# Patient Record
Sex: MALE | Race: BLACK OR AFRICAN AMERICAN | Employment: FULL TIME | URBAN - METROPOLITAN AREA
[De-identification: names, ages, dates, MRNs, and addresses within clinical notes are randomized per-mention and may not be internally consistent; named-entity substitution may affect disease eponyms.]

---

## 2018-11-26 ENCOUNTER — HOSPITAL ENCOUNTER (INPATIENT)
Age: 18
LOS: 4 days | Discharge: HOME | DRG: 637 | End: 2018-11-30
Attending: EMERGENCY MEDICINE | Admitting: FAMILY MEDICINE
Payer: MEDICAID

## 2018-11-26 DIAGNOSIS — E11.10 DIABETIC KETOACIDOSIS WITHOUT COMA ASSOCIATED WITH TYPE 2 DIABETES MELLITUS (HCC): Primary | ICD-10-CM

## 2018-11-26 PROBLEM — E66.01 MORBID OBESITY (HCC): Status: ACTIVE | Noted: 2018-11-26

## 2018-11-26 PROBLEM — G93.41 ACUTE METABOLIC ENCEPHALOPATHY: Status: ACTIVE | Noted: 2018-11-26

## 2018-11-26 LAB
ADMINISTERED INITIALS, ADMINIT: NORMAL
ALBUMIN SERPL-MCNC: 4.5 G/DL (ref 3.5–5)
ALBUMIN/GLOB SERPL: 1.2 {RATIO} (ref 1.2–3.5)
ALP SERPL-CCNC: 131 U/L (ref 50–136)
ALT SERPL-CCNC: 54 U/L (ref 12–65)
ANION GAP SERPL CALC-SCNC: 21 MMOL/L (ref 7–16)
ANION GAP SERPL CALC-SCNC: 25 MMOL/L (ref 7–16)
AST SERPL-CCNC: 22 U/L (ref 15–37)
ATRIAL RATE: 98 BPM
BACTERIA URNS QL MICRO: 0 /HPF
BASOPHILS # BLD: 0 K/UL (ref 0–0.2)
BASOPHILS NFR BLD: 1 % (ref 0–2)
BILIRUB SERPL-MCNC: 0.6 MG/DL (ref 0.2–1.1)
BUN SERPL-MCNC: 10 MG/DL (ref 6–23)
BUN SERPL-MCNC: 13 MG/DL (ref 6–23)
CALCIUM SERPL-MCNC: 8.8 MG/DL (ref 8.3–10.4)
CALCIUM SERPL-MCNC: 9.3 MG/DL (ref 8.3–10.4)
CALCULATED P AXIS, ECG09: 41 DEGREES
CALCULATED R AXIS, ECG10: 178 DEGREES
CALCULATED T AXIS, ECG11: 12 DEGREES
CASTS URNS QL MICRO: 0 /LPF
CHLORIDE SERPL-SCNC: 103 MMOL/L (ref 98–107)
CHLORIDE SERPL-SCNC: 97 MMOL/L (ref 98–107)
CO2 SERPL-SCNC: 12 MMOL/L (ref 21–32)
CO2 SERPL-SCNC: 12 MMOL/L (ref 21–32)
CREAT SERPL-MCNC: 0.98 MG/DL (ref 0.8–1.5)
CREAT SERPL-MCNC: 1.06 MG/DL (ref 0.8–1.5)
CRYSTALS URNS QL MICRO: 0 /LPF
D50 ADMINISTERED, D50ADM: 0 ML
D50 ORDER, D50ORD: 0 ML
DIAGNOSIS, 93000: NORMAL
DIFFERENTIAL METHOD BLD: ABNORMAL
EOSINOPHIL # BLD: 0.2 K/UL (ref 0–0.8)
EOSINOPHIL NFR BLD: 2 % (ref 0.5–7.8)
EPI CELLS #/AREA URNS HPF: NORMAL /HPF
ERYTHROCYTE [DISTWIDTH] IN BLOOD BY AUTOMATED COUNT: 13.6 %
EST. AVERAGE GLUCOSE BLD GHB EST-MCNC: 301 MG/DL
GLOBULIN SER CALC-MCNC: 3.9 G/DL (ref 2.3–3.5)
GLSCOM COMMENTS: NORMAL
GLUCOSE BLD STRIP.AUTO-MCNC: 281 MG/DL (ref 65–100)
GLUCOSE BLD STRIP.AUTO-MCNC: 285 MG/DL (ref 65–100)
GLUCOSE BLD STRIP.AUTO-MCNC: 304 MG/DL (ref 65–100)
GLUCOSE BLD STRIP.AUTO-MCNC: 326 MG/DL (ref 65–100)
GLUCOSE BLD STRIP.AUTO-MCNC: 339 MG/DL (ref 65–100)
GLUCOSE BLD STRIP.AUTO-MCNC: 367 MG/DL (ref 65–100)
GLUCOSE BLD STRIP.AUTO-MCNC: 470 MG/DL (ref 65–100)
GLUCOSE SERPL-MCNC: 275 MG/DL (ref 65–100)
GLUCOSE SERPL-MCNC: 433 MG/DL (ref 65–100)
GLUCOSE, GLC: 203 MG/DL
GLUCOSE, GLC: 281 MG/DL
GLUCOSE, GLC: 285 MG/DL
GLUCOSE, GLC: 304 MG/DL
GLUCOSE, GLC: 326 MG/DL
GLUCOSE, GLC: 339 MG/DL
GLUCOSE, GLC: 357 MG/DL
HBA1C MFR BLD: 12.1 % (ref 4.8–6)
HCT VFR BLD AUTO: 48.8 % (ref 41.1–50.3)
HGB BLD-MCNC: 16.4 G/DL (ref 13.6–17.2)
HIGH TARGET, HITG: 250 MG/DL
IMM GRANULOCYTES # BLD: 0 K/UL (ref 0–0.5)
IMM GRANULOCYTES NFR BLD AUTO: 0 % (ref 0–5)
INSULIN ADMINSTERED, INSADM: 11.1 UNITS/HOUR
INSULIN ADMINSTERED, INSADM: 5.3 UNITS/HOUR
INSULIN ADMINSTERED, INSADM: 5.9 UNITS/HOUR
INSULIN ADMINSTERED, INSADM: 7.2 UNITS/HOUR
INSULIN ADMINSTERED, INSADM: 7.3 UNITS/HOUR
INSULIN ADMINSTERED, INSADM: 8.4 UNITS/HOUR
INSULIN ADMINSTERED, INSADM: 9 UNITS/HOUR
INSULIN ORDER, INSORD: 11.1 UNITS/HOUR
INSULIN ORDER, INSORD: 5.3 UNITS/HOUR
INSULIN ORDER, INSORD: 5.9 UNITS/HOUR
INSULIN ORDER, INSORD: 7.2 UNITS/HOUR
INSULIN ORDER, INSORD: 7.3 UNITS/HOUR
INSULIN ORDER, INSORD: 8.4 UNITS/HOUR
INSULIN ORDER, INSORD: 9 UNITS/HOUR
LOW TARGET, LOT: 150 MG/DL
LYMPHOCYTES # BLD: 3 K/UL (ref 0.5–4.6)
LYMPHOCYTES NFR BLD: 40 % (ref 13–44)
MAGNESIUM SERPL-MCNC: 1.8 MG/DL (ref 1.8–2.4)
MCH RBC QN AUTO: 27.2 PG (ref 26.1–32.9)
MCHC RBC AUTO-ENTMCNC: 33.6 G/DL (ref 31.4–35)
MCV RBC AUTO: 80.9 FL (ref 79.6–97.8)
MINUTES UNTIL NEXT BG, NBG: 60 MIN
MONOCYTES # BLD: 0.6 K/UL (ref 0.1–1.3)
MONOCYTES NFR BLD: 8 % (ref 4–12)
MUCOUS THREADS URNS QL MICRO: 0 /LPF
MULTIPLIER, MUL: 0.02
MULTIPLIER, MUL: 0.02
MULTIPLIER, MUL: 0.03
MULTIPLIER, MUL: 0.03
MULTIPLIER, MUL: 0.04
MULTIPLIER, MUL: 0.05
MULTIPLIER, MUL: 0.05
NEUTS SEG # BLD: 3.6 K/UL (ref 1.7–8.2)
NEUTS SEG NFR BLD: 49 % (ref 43–78)
NRBC # BLD: 0 K/UL (ref 0–0.2)
ORDER INITIALS, ORDINIT: NORMAL
P-R INTERVAL, ECG05: 170 MS
PHOSPHATE SERPL-MCNC: 2 MG/DL (ref 2.5–4.5)
PLATELET # BLD AUTO: 282 K/UL (ref 150–450)
PMV BLD AUTO: 13.3 FL (ref 9.4–12.3)
POTASSIUM SERPL-SCNC: 3.4 MMOL/L (ref 3.5–5.1)
POTASSIUM SERPL-SCNC: 4 MMOL/L (ref 3.5–5.1)
PROT SERPL-MCNC: 8.4 G/DL (ref 6.3–8.2)
Q-T INTERVAL, ECG07: 350 MS
QRS DURATION, ECG06: 102 MS
QTC CALCULATION (BEZET), ECG08: 446 MS
RBC # BLD AUTO: 6.03 M/UL (ref 4.23–5.6)
RBC #/AREA URNS HPF: NORMAL /HPF
SODIUM SERPL-SCNC: 134 MMOL/L (ref 136–145)
SODIUM SERPL-SCNC: 136 MMOL/L (ref 136–145)
VENTRICULAR RATE, ECG03: 98 BPM
WBC # BLD AUTO: 7.4 K/UL (ref 4.3–11.1)
WBC URNS QL MICRO: NORMAL /HPF

## 2018-11-26 PROCEDURE — 82962 GLUCOSE BLOOD TEST: CPT

## 2018-11-26 PROCEDURE — 74011250636 HC RX REV CODE- 250/636: Performed by: FAMILY MEDICINE

## 2018-11-26 PROCEDURE — 74011000258 HC RX REV CODE- 258: Performed by: FAMILY MEDICINE

## 2018-11-26 PROCEDURE — 74011250636 HC RX REV CODE- 250/636: Performed by: EMERGENCY MEDICINE

## 2018-11-26 PROCEDURE — 81003 URINALYSIS AUTO W/O SCOPE: CPT | Performed by: EMERGENCY MEDICINE

## 2018-11-26 PROCEDURE — 83036 HEMOGLOBIN GLYCOSYLATED A1C: CPT

## 2018-11-26 PROCEDURE — 80053 COMPREHEN METABOLIC PANEL: CPT

## 2018-11-26 PROCEDURE — 93005 ELECTROCARDIOGRAM TRACING: CPT | Performed by: EMERGENCY MEDICINE

## 2018-11-26 PROCEDURE — 65620000000 HC RM CCU GENERAL

## 2018-11-26 PROCEDURE — 99284 EMERGENCY DEPT VISIT MOD MDM: CPT | Performed by: EMERGENCY MEDICINE

## 2018-11-26 PROCEDURE — C9113 INJ PANTOPRAZOLE SODIUM, VIA: HCPCS | Performed by: HOSPITALIST

## 2018-11-26 PROCEDURE — 85025 COMPLETE CBC W/AUTO DIFF WBC: CPT

## 2018-11-26 PROCEDURE — 81015 MICROSCOPIC EXAM OF URINE: CPT

## 2018-11-26 PROCEDURE — 84100 ASSAY OF PHOSPHORUS: CPT

## 2018-11-26 PROCEDURE — 74011250636 HC RX REV CODE- 250/636: Performed by: HOSPITALIST

## 2018-11-26 PROCEDURE — 74011636637 HC RX REV CODE- 636/637: Performed by: FAMILY MEDICINE

## 2018-11-26 PROCEDURE — 74011000250 HC RX REV CODE- 250: Performed by: HOSPITALIST

## 2018-11-26 PROCEDURE — 96374 THER/PROPH/DIAG INJ IV PUSH: CPT | Performed by: EMERGENCY MEDICINE

## 2018-11-26 PROCEDURE — 74011636637 HC RX REV CODE- 636/637: Performed by: EMERGENCY MEDICINE

## 2018-11-26 PROCEDURE — 36415 COLL VENOUS BLD VENIPUNCTURE: CPT

## 2018-11-26 PROCEDURE — 83735 ASSAY OF MAGNESIUM: CPT

## 2018-11-26 RX ORDER — POTASSIUM CHLORIDE AND SODIUM CHLORIDE 450; 150 MG/100ML; MG/100ML
INJECTION, SOLUTION INTRAVENOUS CONTINUOUS
Status: DISCONTINUED | OUTPATIENT
Start: 2018-11-26 | End: 2018-11-27

## 2018-11-26 RX ORDER — ENOXAPARIN SODIUM 100 MG/ML
40 INJECTION SUBCUTANEOUS EVERY 12 HOURS
Status: DISCONTINUED | OUTPATIENT
Start: 2018-11-26 | End: 2018-11-30 | Stop reason: HOSPADM

## 2018-11-26 RX ORDER — NALOXONE HYDROCHLORIDE 0.4 MG/ML
0.4 INJECTION, SOLUTION INTRAMUSCULAR; INTRAVENOUS; SUBCUTANEOUS AS NEEDED
Status: DISCONTINUED | OUTPATIENT
Start: 2018-11-26 | End: 2018-11-30 | Stop reason: HOSPADM

## 2018-11-26 RX ORDER — DEXTROSE 40 %
15 GEL (GRAM) ORAL AS NEEDED
Status: DISCONTINUED | OUTPATIENT
Start: 2018-11-26 | End: 2018-11-27

## 2018-11-26 RX ORDER — ACETAMINOPHEN 325 MG/1
650 TABLET ORAL
Status: DISCONTINUED | OUTPATIENT
Start: 2018-11-26 | End: 2018-11-30 | Stop reason: HOSPADM

## 2018-11-26 RX ORDER — HYDRALAZINE HYDROCHLORIDE 20 MG/ML
10 INJECTION INTRAMUSCULAR; INTRAVENOUS
Status: DISCONTINUED | OUTPATIENT
Start: 2018-11-26 | End: 2018-11-30 | Stop reason: HOSPADM

## 2018-11-26 RX ORDER — HYDROCODONE BITARTRATE AND ACETAMINOPHEN 5; 325 MG/1; MG/1
1 TABLET ORAL
Status: DISCONTINUED | OUTPATIENT
Start: 2018-11-26 | End: 2018-11-30 | Stop reason: HOSPADM

## 2018-11-26 RX ORDER — DEXTROSE 50 % IN WATER (D50W) INTRAVENOUS SYRINGE
25-50 AS NEEDED
Status: DISCONTINUED | OUTPATIENT
Start: 2018-11-26 | End: 2018-11-27

## 2018-11-26 RX ADMIN — SODIUM CHLORIDE 40 MG: 9 INJECTION INTRAMUSCULAR; INTRAVENOUS; SUBCUTANEOUS at 20:01

## 2018-11-26 RX ADMIN — SODIUM CHLORIDE AND POTASSIUM CHLORIDE: 4.5; 1.49 INJECTION, SOLUTION INTRAVENOUS at 17:36

## 2018-11-26 RX ADMIN — SODIUM CHLORIDE 1000 ML: 900 INJECTION, SOLUTION INTRAVENOUS at 14:33

## 2018-11-26 RX ADMIN — SODIUM CHLORIDE 5.9 UNITS/HR: 9 INJECTION, SOLUTION INTRAVENOUS at 17:37

## 2018-11-26 RX ADMIN — HUMAN INSULIN 10 UNITS: 100 INJECTION, SOLUTION SUBCUTANEOUS at 14:33

## 2018-11-26 RX ADMIN — HYDRALAZINE HYDROCHLORIDE 10 MG: 20 INJECTION INTRAMUSCULAR; INTRAVENOUS at 20:02

## 2018-11-26 NOTE — PROGRESS NOTES
TRANSFER - IN REPORT: 
 
Verbal report received from Donovan Leone, [place] (name) on Xu Arce  being received from ED (unit) for change in patient condition(glucostabilizer) Report consisted of patients Situation, Background, Assessment and  
Recommendations(SBAR). Information from the following report(s) SBAR, Kardex, ED Summary, Intake/Output, MAR, Recent Results and Cardiac Rhythm NSR was reviewed with the receiving nurse. Opportunity for questions and clarification was provided. Assessment completed upon patients arrival to unit and care assumed.

## 2018-11-26 NOTE — ED PROVIDER NOTES
Patient complains of blurred vision, increased thirst, nausea and vomiting, dizziness. Diagnosed with DM last summer at [address] In October he went to [facility] for school and did not take his insulin or glucometer. Has not used either in the past month. No diarrhea. No fever. No pain. No dysuria. No cough. Not a smoker. No alcohol. No past medical history on file. No past surgical history on file. No family history on file. Social History Socioeconomic History  Marital status: SINGLE Spouse name: Not on file  Number of children: Not on file  Years of education: Not on file  Highest education level: Not on file Social Needs  Financial resource strain: Not on file  Food insecurity - worry: Not on file  Food insecurity - inability: Not on file  Transportation needs - medical: Not on file  Transportation needs - non-medical: Not on file Occupational History  Not on file Tobacco Use  Smoking status: Not on file Substance and Sexual Activity  Alcohol use: Not on file  Drug use: Not on file  Sexual activity: Not on file Other Topics Concern  Not on file Social History Narrative  Not on file ALLERGIES: Patient has no known allergies. Review of Systems Constitutional: Positive for fatigue. Negative for appetite change. HENT: Negative. Eyes: Positive for visual disturbance. Respiratory: Negative. Cardiovascular: Negative. Gastrointestinal: Positive for nausea and vomiting. Negative for abdominal pain and diarrhea. Endocrine: Positive for polydipsia and polyuria. Genitourinary: Positive for frequency. Negative for dysuria. Musculoskeletal: Negative. Skin: Negative. Neurological: Positive for dizziness. Negative for weakness. Hematological: Negative. There were no vitals filed for this visit. Physical Exam  
Constitutional: He is oriented to person, place, and time. Non-toxic appearance. obese HENT:  
Head: Normocephalic and atraumatic. Eyes: EOM are normal. Pupils are equal, round, and reactive to light. MM dry Neck: Normal range of motion. Neck supple. Cardiovascular: Normal rate, regular rhythm, normal heart sounds and intact distal pulses. Pulmonary/Chest: Effort normal.  
Abdominal: Soft. Bowel sounds are normal. There is no tenderness. Musculoskeletal: Normal range of motion. Neurological: He is alert and oriented to person, place, and time. He has normal strength. Skin: Skin is warm and dry. Psychiatric: He has a normal mood and affect. Nursing note and vitals reviewed. MDM Number of Diagnoses or Management Options Diagnosis management comments: DKA Labs reviewed EKG sinus, rate 98, T wave inversion III. Insulin 10 units regular IV 
NS 1 liter IV Ice chips Zofran 4 mg IV Consult Hospitalist - Dr. Silvestre Corcoran - they will see Amount and/or Complexity of Data Reviewed Clinical lab tests: ordered and reviewed Discuss the patient with other providers: yes Independent visualization of images, tracings, or specimens: yes Critical Care Total time providing critical care: 30-74 minutes (Critical care time 35 minutes) Patient Progress Patient progress: stable Procedures

## 2018-11-26 NOTE — H&P
Hospitalist Admission History and Physical  
 
NAME:  Leandro Montalvo Age:  19 y.o. 
:   2000 MRN:   [id number] PCP: Unknown, Provider Consulting MD: Treatment Team: Attending Provider: Vessie Scheuermann, MD; Primary Nurse: Angelina Fitzgerald RN Chief Complaint Patient presents with  Altered mental status HPI:  
Patient is a 19 y.o. male who presented to the ED for a cc of AMS. Patient states he has a hx significant for DM type 2 insulin dependent diagnosed this summer. Patient states he is supposed to be taking insulin but lost his medication. He is not sure what dose of insulin he was supposed to be on and we cannot find in records. Since loosing his insulin, he has not been taking any medication. Since this Thanksgiving, has noticed increased altered mentation along with increased urinary frequency. Vitals stable. Glucose 470. CO2 12 and anion gap 25. UA pending. No past medical history on file. Type 2 DM insulin dependent No past surgical history on file. No surgeries No family history on file. No significant family hx Social History Social History Narrative  Not on file Social History Tobacco Use  Smoking status: Not on file Substance Use Topics  Alcohol use: Not on file Social History Substance and Sexual Activity Drug Use Not on file No Known Allergies Prior to Admission medications Not on File Review of Systems Constitutional: well nourished male in NAD Eyes:  no change in visual acuity, no photophobia Ears, nose, mouth, throat, and face: no  Odynphagia, dysphagia, no thrush or exudate, negative for chronic sinus congestion, recurrent headaches Respiratory: negative for SOB, hemoptysis or cough Cardiovascular: negative for CP, palpitations, or PND Gastrointestinal: negative for abdominal pain, no hematemesis, hematochezia or BRBPR Genitourinary: increased urinary frequency Integument/breast: negative for skin rash or skin lesions Hematologic/lymphatic: negative for known bleeding disorder Musculoskeletal:negative for joint pain or joint tenderness Neurological: Altered mentation with confusion Behavioral/Psych: negative for depression or chronic anxiety, Endocrine: negative for polydyspia, polyuria or intolerance to heat or cold Allergic/Immunologic: negative for chronic allergic rhinitis, or known connective tissue disorder Objective:  
 
Visit Vitals /67 SpO2 95% No intake/output data recorded. No intake/output data recorded. Data Review:  
Recent Results (from the past 24 hour(s)) GLUCOSE, POC Collection Time: 11/26/18  2:20 PM  
Result Value Ref Range Glucose (POC) 470 (HH) 65 - 100 mg/dL CBC WITH AUTOMATED DIFF Collection Time: 11/26/18  2:32 PM  
Result Value Ref Range WBC 7.4 4.3 - 11.1 K/uL  
 RBC 6.03 (H) 4.23 - 5.6 M/uL  
 HGB 16.4 13.6 - 17.2 g/dL HCT 48.8 41.1 - 50.3 % MCV 80.9 79.6 - 97.8 FL  
 MCH 27.2 26.1 - 32.9 PG  
 MCHC 33.6 31.4 - 35.0 g/dL  
 RDW 13.6 % PLATELET 089 745 - 987 K/uL MPV 13.3 (H) 9.4 - 12.3 FL ABSOLUTE NRBC 0.00 0.0 - 0.2 K/uL  
 DF AUTOMATED NEUTROPHILS 49 43 - 78 % LYMPHOCYTES 40 13 - 44 % MONOCYTES 8 4.0 - 12.0 % EOSINOPHILS 2 0.5 - 7.8 % BASOPHILS 1 0.0 - 2.0 % IMMATURE GRANULOCYTES 0 0.0 - 5.0 %  
 ABS. NEUTROPHILS 3.6 1.7 - 8.2 K/UL  
 ABS. LYMPHOCYTES 3.0 0.5 - 4.6 K/UL  
 ABS. MONOCYTES 0.6 0.1 - 1.3 K/UL  
 ABS. EOSINOPHILS 0.2 0.0 - 0.8 K/UL  
 ABS. BASOPHILS 0.0 0.0 - 0.2 K/UL  
 ABS. IMM. GRANS. 0.0 0.0 - 0.5 K/UL METABOLIC PANEL, COMPREHENSIVE Collection Time: 11/26/18  2:32 PM  
Result Value Ref Range Sodium 134 (L) 136 - 145 mmol/L Potassium 4.0 3.5 - 5.1 mmol/L Chloride 97 (L) 98 - 107 mmol/L  
 CO2 12 (L) 21 - 32 mmol/L Anion gap 25 (H) 7 - 16 mmol/L Glucose 433 (H) 65 - 100 mg/dL  BUN 13 6 - 23 MG/DL  
 Creatinine 1.06 0.8 - 1.5 MG/DL  
 GFR est AA >60 >60 ml/min/1.73m2 GFR est non-AA >60 >60 ml/min/1.73m2 Calcium 9.3 8.3 - 10.4 MG/DL Bilirubin, total 0.6 0.2 - 1.1 MG/DL  
 ALT (SGPT) 54 12 - 65 U/L  
 AST (SGOT) 22 15 - 37 U/L Alk. phosphatase 131 50 - 136 U/L Protein, total 8.4 (H) 6.3 - 8.2 g/dL Albumin 4.5 3.5 - 5.0 g/dL Globulin 3.9 (H) 2.3 - 3.5 g/dL A-G Ratio 1.2 1.2 - 3.5 Physical Exam:  
 
General:  Alert, cooperative, no distress, appears stated age. Eyes:  Conjunctivae/corneas clear. Ears:  Normal TMs and external ear canals both ears. Nose: Nares normal. Septum midline. Mouth/Throat: Dry oral mucosa Neck: no JVD. Back:   deferred Lungs:   Clear to auscultation bilaterally. Heart:  Regular rate and rhythm, S1, S2 normal, no murmur, click, rub or gallop. Abdomen:   Soft, non-tender. Bowel sounds normal. No masses,  No organomegaly. Obese Extremities: Extremities normal, atraumatic, no cyanosis or edema. Pulses: 2+ and symmetric all extremities. Skin: Skin color, texture, turgor normal. No rashes or lesions Lymph nodes: Cervical, supraclavicular, and axillary nodes normal.  
Neurologic: Mild confusion during exam but able to answer all questions appropriately Assessment and Plan Principal Problem: 
  DKA (diabetic ketoacidoses) ([facility] [address] (11/26/2018) Active Problems: 
  Type II diabetes mellitus, uncontrolled ([facility] [address] (11/26/2018) Morbid obesity ([facility] [address] (11/26/2018) Acute metabolic encephalopathy (05/14/7520) DKA protocol. UA pending. Consult DM education. Order 0.45% NS with KCL 20meq 250ml/hr until glucose is 200. Call physician at that time for new fluid order set due to needing dextrose. A1C ordered. Check BMP q 4 hrs until gap closed. Check Mg and phosphorus Obesity - DM education Acute metabolic encephalopathy - secondary to DKA. UA officially pending. Expect mentation to improve with orders as above. DVT prophylaxis - Lovenox Signed By: Jamel Farnsworth, DO November 26, 2018

## 2018-11-26 NOTE — ED NOTES
Spoke with Dr. Adelfo Jeffers and per her verbal order this RN will run the fluids and insulin concurrently.

## 2018-11-26 NOTE — ED NOTES
TRANSFER - OUT REPORT: 
 
Verbal report given to Justine(name) on Cinda Moreira  being transferred to [facility](unit) for routine progression of care Report consisted of patients Situation, Background, Assessment and  
Recommendations(SBAR). Information from the following report(s) SBAR, ED Summary, MAR, Recent Results and Cardiac Rhythm NSR was reviewed with the receiving nurse. Lines:  
Peripheral IV 11/26/18 Right Hand (Active) Opportunity for questions and clarification was provided. Patient transported with: 
 Monitor Registered Nurse

## 2018-11-26 NOTE — ED TRIAGE NOTES
Pt. Reports confusion, excessive urination, vomiting, and blurry vision, worsening x 3 days. Pt. States he is a diabetic and does not know the last time he checked his bgl.

## 2018-11-27 LAB
ADMINISTERED INITIALS, ADMINIT: NORMAL
ANION GAP SERPL CALC-SCNC: 12 MMOL/L (ref 7–16)
ANION GAP SERPL CALC-SCNC: 13 MMOL/L (ref 7–16)
ANION GAP SERPL CALC-SCNC: 16 MMOL/L (ref 7–16)
ANION GAP SERPL CALC-SCNC: 16 MMOL/L (ref 7–16)
ANION GAP SERPL CALC-SCNC: 18 MMOL/L (ref 7–16)
BUN SERPL-MCNC: 6 MG/DL (ref 6–23)
BUN SERPL-MCNC: 7 MG/DL (ref 6–23)
BUN SERPL-MCNC: 8 MG/DL (ref 6–23)
BUN SERPL-MCNC: 9 MG/DL (ref 6–23)
BUN SERPL-MCNC: 9 MG/DL (ref 6–23)
CALCIUM SERPL-MCNC: 8.4 MG/DL (ref 8.3–10.4)
CALCIUM SERPL-MCNC: 8.4 MG/DL (ref 8.3–10.4)
CALCIUM SERPL-MCNC: 8.6 MG/DL (ref 8.3–10.4)
CALCIUM SERPL-MCNC: 8.8 MG/DL (ref 8.3–10.4)
CALCIUM SERPL-MCNC: 8.8 MG/DL (ref 8.3–10.4)
CHLORIDE SERPL-SCNC: 103 MMOL/L (ref 98–107)
CHLORIDE SERPL-SCNC: 103 MMOL/L (ref 98–107)
CHLORIDE SERPL-SCNC: 104 MMOL/L (ref 98–107)
CHLORIDE SERPL-SCNC: 104 MMOL/L (ref 98–107)
CHLORIDE SERPL-SCNC: 105 MMOL/L (ref 98–107)
CO2 SERPL-SCNC: 13 MMOL/L (ref 21–32)
CO2 SERPL-SCNC: 15 MMOL/L (ref 21–32)
CO2 SERPL-SCNC: 15 MMOL/L (ref 21–32)
CO2 SERPL-SCNC: 17 MMOL/L (ref 21–32)
CO2 SERPL-SCNC: 18 MMOL/L (ref 21–32)
CREAT SERPL-MCNC: 0.75 MG/DL (ref 0.8–1.5)
CREAT SERPL-MCNC: 0.76 MG/DL (ref 0.8–1.5)
CREAT SERPL-MCNC: 0.81 MG/DL (ref 0.8–1.5)
CREAT SERPL-MCNC: 0.88 MG/DL (ref 0.8–1.5)
CREAT SERPL-MCNC: 0.88 MG/DL (ref 0.8–1.5)
D50 ADMINISTERED, D50ADM: 0 ML
D50 ORDER, D50ORD: 0 ML
GLSCOM COMMENTS: NORMAL
GLUCOSE BLD STRIP.AUTO-MCNC: 152 MG/DL (ref 65–100)
GLUCOSE BLD STRIP.AUTO-MCNC: 158 MG/DL (ref 65–100)
GLUCOSE BLD STRIP.AUTO-MCNC: 159 MG/DL (ref 65–100)
GLUCOSE BLD STRIP.AUTO-MCNC: 163 MG/DL (ref 65–100)
GLUCOSE BLD STRIP.AUTO-MCNC: 179 MG/DL (ref 65–100)
GLUCOSE BLD STRIP.AUTO-MCNC: 182 MG/DL (ref 65–100)
GLUCOSE BLD STRIP.AUTO-MCNC: 200 MG/DL (ref 65–100)
GLUCOSE BLD STRIP.AUTO-MCNC: 203 MG/DL (ref 65–100)
GLUCOSE BLD STRIP.AUTO-MCNC: 206 MG/DL (ref 65–100)
GLUCOSE BLD STRIP.AUTO-MCNC: 214 MG/DL (ref 65–100)
GLUCOSE BLD STRIP.AUTO-MCNC: 214 MG/DL (ref 65–100)
GLUCOSE BLD STRIP.AUTO-MCNC: 215 MG/DL (ref 65–100)
GLUCOSE BLD STRIP.AUTO-MCNC: 216 MG/DL (ref 65–100)
GLUCOSE BLD STRIP.AUTO-MCNC: 225 MG/DL (ref 65–100)
GLUCOSE BLD STRIP.AUTO-MCNC: 227 MG/DL (ref 65–100)
GLUCOSE BLD STRIP.AUTO-MCNC: 250 MG/DL (ref 65–100)
GLUCOSE BLD STRIP.AUTO-MCNC: 292 MG/DL (ref 65–100)
GLUCOSE SERPL-MCNC: 170 MG/DL (ref 65–100)
GLUCOSE SERPL-MCNC: 180 MG/DL (ref 65–100)
GLUCOSE SERPL-MCNC: 224 MG/DL (ref 65–100)
GLUCOSE SERPL-MCNC: 232 MG/DL (ref 65–100)
GLUCOSE SERPL-MCNC: 234 MG/DL (ref 65–100)
GLUCOSE, GLC: 152 MG/DL
GLUCOSE, GLC: 158 MG/DL
GLUCOSE, GLC: 159 MG/DL
GLUCOSE, GLC: 163 MG/DL
GLUCOSE, GLC: 179 MG/DL
GLUCOSE, GLC: 182 MG/DL
GLUCOSE, GLC: 200 MG/DL
GLUCOSE, GLC: 206 MG/DL
GLUCOSE, GLC: 214 MG/DL
GLUCOSE, GLC: 214 MG/DL
GLUCOSE, GLC: 215 MG/DL
GLUCOSE, GLC: 216 MG/DL
GLUCOSE, GLC: 225 MG/DL
GLUCOSE, GLC: 227 MG/DL
GLUCOSE, GLC: 250 MG/DL
HIGH TARGET, HITG: 250 MG/DL
INSULIN ADMINSTERED, INSADM: 4.6 UNITS/HOUR
INSULIN ADMINSTERED, INSADM: 4.9 UNITS/HOUR
INSULIN ADMINSTERED, INSADM: 5 UNITS/HOUR
INSULIN ADMINSTERED, INSADM: 5.2 UNITS/HOUR
INSULIN ADMINSTERED, INSADM: 6 UNITS/HOUR
INSULIN ADMINSTERED, INSADM: 6.1 UNITS/HOUR
INSULIN ADMINSTERED, INSADM: 7 UNITS/HOUR
INSULIN ADMINSTERED, INSADM: 7.3 UNITS/HOUR
INSULIN ADMINSTERED, INSADM: 7.7 UNITS/HOUR
INSULIN ADMINSTERED, INSADM: 7.7 UNITS/HOUR
INSULIN ADMINSTERED, INSADM: 7.8 UNITS/HOUR
INSULIN ADMINSTERED, INSADM: 7.8 UNITS/HOUR
INSULIN ADMINSTERED, INSADM: 8.3 UNITS/HOUR
INSULIN ADMINSTERED, INSADM: 8.4 UNITS/HOUR
INSULIN ADMINSTERED, INSADM: 9.5 UNITS/HOUR
INSULIN ORDER, INSORD: 4.6 UNITS/HOUR
INSULIN ORDER, INSORD: 4.9 UNITS/HOUR
INSULIN ORDER, INSORD: 5 UNITS/HOUR
INSULIN ORDER, INSORD: 5.2 UNITS/HOUR
INSULIN ORDER, INSORD: 6 UNITS/HOUR
INSULIN ORDER, INSORD: 6.1 UNITS/HOUR
INSULIN ORDER, INSORD: 7 UNITS/HOUR
INSULIN ORDER, INSORD: 7.3 UNITS/HOUR
INSULIN ORDER, INSORD: 7.7 UNITS/HOUR
INSULIN ORDER, INSORD: 7.7 UNITS/HOUR
INSULIN ORDER, INSORD: 7.8 UNITS/HOUR
INSULIN ORDER, INSORD: 7.8 UNITS/HOUR
INSULIN ORDER, INSORD: 8.3 UNITS/HOUR
INSULIN ORDER, INSORD: 8.4 UNITS/HOUR
INSULIN ORDER, INSORD: 9.5 UNITS/HOUR
LOW TARGET, LOT: 150 MG/DL
MAGNESIUM SERPL-MCNC: 1.6 MG/DL (ref 1.8–2.4)
MAGNESIUM SERPL-MCNC: 1.7 MG/DL (ref 1.8–2.4)
MAGNESIUM SERPL-MCNC: 1.8 MG/DL (ref 1.8–2.4)
MAGNESIUM SERPL-MCNC: 1.9 MG/DL (ref 1.8–2.4)
MAGNESIUM SERPL-MCNC: 2.1 MG/DL (ref 1.8–2.4)
MINUTES UNTIL NEXT BG, NBG: 120 MIN
MINUTES UNTIL NEXT BG, NBG: 60 MIN
MULTIPLIER, MUL: 0.05
ORDER INITIALS, ORDINIT: NORMAL
POTASSIUM SERPL-SCNC: 3.3 MMOL/L (ref 3.5–5.1)
POTASSIUM SERPL-SCNC: 3.4 MMOL/L (ref 3.5–5.1)
POTASSIUM SERPL-SCNC: 3.4 MMOL/L (ref 3.5–5.1)
POTASSIUM SERPL-SCNC: 3.7 MMOL/L (ref 3.5–5.1)
POTASSIUM SERPL-SCNC: 3.7 MMOL/L (ref 3.5–5.1)
SODIUM SERPL-SCNC: 134 MMOL/L (ref 136–145)
SODIUM SERPL-SCNC: 135 MMOL/L (ref 136–145)
SODIUM SERPL-SCNC: 135 MMOL/L (ref 136–145)

## 2018-11-27 PROCEDURE — 82962 GLUCOSE BLOOD TEST: CPT

## 2018-11-27 PROCEDURE — 36415 COLL VENOUS BLD VENIPUNCTURE: CPT

## 2018-11-27 PROCEDURE — 74011000250 HC RX REV CODE- 250: Performed by: HOSPITALIST

## 2018-11-27 PROCEDURE — 74011000250 HC RX REV CODE- 250: Performed by: INTERNAL MEDICINE

## 2018-11-27 PROCEDURE — 74011000258 HC RX REV CODE- 258: Performed by: INTERNAL MEDICINE

## 2018-11-27 PROCEDURE — 74011000258 HC RX REV CODE- 258: Performed by: FAMILY MEDICINE

## 2018-11-27 PROCEDURE — 74011636637 HC RX REV CODE- 636/637: Performed by: INTERNAL MEDICINE

## 2018-11-27 PROCEDURE — 74011636637 HC RX REV CODE- 636/637: Performed by: FAMILY MEDICINE

## 2018-11-27 PROCEDURE — 65620000000 HC RM CCU GENERAL

## 2018-11-27 PROCEDURE — C9113 INJ PANTOPRAZOLE SODIUM, VIA: HCPCS | Performed by: HOSPITALIST

## 2018-11-27 PROCEDURE — 77030020253 HC SOL INJ D545NS .05 DEX .45 SAL

## 2018-11-27 PROCEDURE — 74011250636 HC RX REV CODE- 250/636: Performed by: INTERNAL MEDICINE

## 2018-11-27 PROCEDURE — 80048 BASIC METABOLIC PNL TOTAL CA: CPT

## 2018-11-27 PROCEDURE — 74011250636 HC RX REV CODE- 250/636: Performed by: HOSPITALIST

## 2018-11-27 PROCEDURE — 83735 ASSAY OF MAGNESIUM: CPT

## 2018-11-27 PROCEDURE — 74011250636 HC RX REV CODE- 250/636: Performed by: FAMILY MEDICINE

## 2018-11-27 PROCEDURE — 77030020263 HC SOL INJ SOD CL0.9% LFCR 1000ML

## 2018-11-27 RX ORDER — SODIUM CHLORIDE 9 MG/ML
100 INJECTION, SOLUTION INTRAVENOUS CONTINUOUS
Status: DISCONTINUED | OUTPATIENT
Start: 2018-11-27 | End: 2018-11-30 | Stop reason: HOSPADM

## 2018-11-27 RX ORDER — MAGNESIUM SULFATE HEPTAHYDRATE 40 MG/ML
2 INJECTION, SOLUTION INTRAVENOUS ONCE
Status: COMPLETED | OUTPATIENT
Start: 2018-11-27 | End: 2018-11-27

## 2018-11-27 RX ORDER — DEXTROSE MONOHYDRATE AND SODIUM CHLORIDE 5; .45 G/100ML; G/100ML
150 INJECTION, SOLUTION INTRAVENOUS CONTINUOUS
Status: DISCONTINUED | OUTPATIENT
Start: 2018-11-27 | End: 2018-11-27

## 2018-11-27 RX ORDER — INSULIN GLARGINE 100 [IU]/ML
40 INJECTION, SOLUTION SUBCUTANEOUS EVERY 12 HOURS
Status: DISCONTINUED | OUTPATIENT
Start: 2018-11-27 | End: 2018-11-28

## 2018-11-27 RX ORDER — DEXTROSE MONOHYDRATE AND SODIUM CHLORIDE 5; .45 G/100ML; G/100ML
250 INJECTION, SOLUTION INTRAVENOUS CONTINUOUS
Status: DISCONTINUED | OUTPATIENT
Start: 2018-11-27 | End: 2018-11-27

## 2018-11-27 RX ORDER — POTASSIUM CHLORIDE 14.9 MG/ML
20 INJECTION INTRAVENOUS
Status: COMPLETED | OUTPATIENT
Start: 2018-11-27 | End: 2018-11-27

## 2018-11-27 RX ORDER — INSULIN GLARGINE 100 [IU]/ML
40 INJECTION, SOLUTION SUBCUTANEOUS EVERY 12 HOURS
Status: DISCONTINUED | OUTPATIENT
Start: 2018-11-27 | End: 2018-11-27

## 2018-11-27 RX ADMIN — ENOXAPARIN SODIUM 40 MG: 40 INJECTION, SOLUTION INTRAVENOUS; SUBCUTANEOUS at 17:24

## 2018-11-27 RX ADMIN — DEXTROSE MONOHYDRATE AND SODIUM CHLORIDE 250 ML/HR: 5; .45 INJECTION, SOLUTION INTRAVENOUS at 06:04

## 2018-11-27 RX ADMIN — DEXTROSE MONOHYDRATE AND SODIUM CHLORIDE 150 ML/HR: 5; .45 INJECTION, SOLUTION INTRAVENOUS at 11:15

## 2018-11-27 RX ADMIN — POTASSIUM CHLORIDE 20 MEQ: 200 INJECTION, SOLUTION INTRAVENOUS at 10:00

## 2018-11-27 RX ADMIN — INSULIN GLARGINE 40 UNITS: 100 INJECTION, SOLUTION SUBCUTANEOUS at 17:47

## 2018-11-27 RX ADMIN — SODIUM CHLORIDE AND POTASSIUM CHLORIDE: 4.5; 1.49 INJECTION, SOLUTION INTRAVENOUS at 00:01

## 2018-11-27 RX ADMIN — INSULIN HUMAN 9 UNITS: 100 INJECTION, SOLUTION PARENTERAL at 21:25

## 2018-11-27 RX ADMIN — MAGNESIUM SULFATE HEPTAHYDRATE 2 G: 40 INJECTION, SOLUTION INTRAVENOUS at 04:50

## 2018-11-27 RX ADMIN — SODIUM CHLORIDE 100 ML/HR: 900 INJECTION, SOLUTION INTRAVENOUS at 17:46

## 2018-11-27 RX ADMIN — SODIUM CHLORIDE 4.6 UNITS/HR: 9 INJECTION, SOLUTION INTRAVENOUS at 06:36

## 2018-11-27 RX ADMIN — SODIUM CHLORIDE 40 MG: 9 INJECTION INTRAMUSCULAR; INTRAVENOUS; SUBCUTANEOUS at 08:18

## 2018-11-27 RX ADMIN — ENOXAPARIN SODIUM 40 MG: 40 INJECTION, SOLUTION INTRAVENOUS; SUBCUTANEOUS at 06:01

## 2018-11-27 RX ADMIN — SODIUM CHLORIDE: 900 INJECTION, SOLUTION INTRAVENOUS at 01:01

## 2018-11-27 RX ADMIN — POTASSIUM CHLORIDE 20 MEQ: 200 INJECTION, SOLUTION INTRAVENOUS at 07:59

## 2018-11-27 NOTE — PROGRESS NOTES
Dr. Capri Jefferson paged about pt wanting an antacid and SBP= 177. Orders for Protonix 40 mg 2xday and PRN hydralazine 10mg IV Q6 for SBP>170 and DBP>90.

## 2018-11-27 NOTE — PROGRESS NOTES
Pt admitted to CCU. Pt on insulin gtt. Pt is alert and oriented x4. Skin is c/d/i. CHG bath done by pt. Dual skin assessment with Salas Rowe RN. Pt c/o blurry vision at a distance. BGL>300. Pt education given to pt about diabetic neuropathy. See flowsheet for full assessment.

## 2018-11-27 NOTE — PROGRESS NOTES
Return call from [facility] in admissions stating pt is ILYA HERNADEZ, which is self pay here in [address] will see pt.

## 2018-11-27 NOTE — PROGRESS NOTES
LEAPFROG PROTOCOL NOTE Summer Dimas 11/27/2018 The patient is currently in the critical care setting managed by Dr. Elian Barber with DKA. The patient's chart is reviewed and the patient is discussed with the staff. Patient is currently hemodynamically stable. Visit Vitals /85 Pulse (!) 108 Temp 98.1 °F (36.7 °C) Resp 22 Ht 5' 7\" (1.702 m) Wt 315 lb (142.9 kg) SpO2 100% BMI 49.34 kg/m² Patient has no needs identified for Intensivist management in the critical care setting at this time. Please notify us if can be of assistance. No charge billed to the patient. Thank you.  
 
Eyad Gonzalez MD

## 2018-11-27 NOTE — PROGRESS NOTES
No needs voiced at present for d/c. States he is able financially to afford medication. Pt works at [employer] at [employer] he states. Thinks he may have [employer] insurance. Will call admission and have them run. [facility] referral completed as well. Also, may have work insurance. CM available for any d/c needs per MD.  
 
Care Management Interventions PCP Verified by CM: Yes(Ruth Bella? - [facility]) Mode of Transport at Discharge: Other (see comment) Transition of Care Consult (CM Consult): Discharge Planning Discharge Durable Medical Equipment: (none) Current Support Network: Relative's Home(lives with his parents) Confirm Follow Up Transport: Self(drives self) Plan discussed with Pt/Family/Caregiver: Yes Freedom of Choice Offered: Yes [employer] Information Provided?: (thinks he has MAR[employer]/and possibly work insurance) Discharge Location Discharge Placement: Home

## 2018-11-27 NOTE — INTERDISCIPLINARY ROUNDS
Interdisciplinary team rounds were held 11/27/2018 with the following team members:Care Management, Nursing, Nurse Practitioner, Palliative Care, Pastoral Care, Pharmacy, Physical Therapy, Physician, Respiratory Therapy and Clinical Coordinator. Plan of care discussed. See clinical pathway and/or care plan for interventions and desired outcomes.

## 2018-11-27 NOTE — PROGRESS NOTES
Shift report given to Derrick Coon, [facility]. Dual skin assessment performed and IV insulin gtt signed off.

## 2018-11-27 NOTE — PROGRESS NOTES
Call to Brent Márquez in admission to check on [employer]. Did note pt lives in [address], [place], so may be [place] [employer]. Brent Márquez let CM know.

## 2018-11-27 NOTE — PROGRESS NOTES
Progress Note Patient: Scott Carson MRN: [id number]  SSN: [id number] YOB: 2000  Age: 19 y.o. Sex: male Admit Date: 11/26/2018 LOS: 1 day Subjective:  
 
DM type 2, on insulin, obesity. Patient stated that he lost his medications piror to admission. Came with hyperglycemia and acute metabolic encephalopathy. This morning, patient is feeling stronger and asks for food. BS is still in 200's ranges with AG of 16 now. Good urine output. No fever. No shaking. No chills. Objective:  
 
Vitals:  
 11/27/18 0701 11/27/18 0802 11/27/18 0902 11/27/18 1002 BP: 143/67 142/76 150/88 134/84 Pulse: 82 91 89 (!) 105 Resp: 26 23 Temp: 98.4 °F (36.9 °C) SpO2: 98% 98% 98% 99% Weight:      
Height:      
  
 
Intake and Output: 
Current Shift: 11/27 0701 - 11/27 1900 In: -  
Out: 8783 [MRXAQ:[id number]] Last three shifts: 11/25 1901 - 11/27 0700 In: 3237.7 [I.V.:3237.7] Out: 800 [Urine:800] Physical Exam:  
 
General:                    The patient is a young male in no acute distress. Obese. Head:                                   Normocephalic/atraumatic. Eyes:                                   No palpebral pallor or scleral icterus. ENT:                                    External auricular and nasal exam within normal limits. Mucous membranes are moist. 
Neck:                                   Supple, non-tender, no JVD. Lungs:                       Clear to auscultation bilaterally without wheezes or crackles. No respiratory distress or accessory muscle use. Heart:                                  Regular rate and rhythm, without murmurs, rubs, or gallops. Abdomen:                  Soft, non-tender, distended due to obesity with normoactive bowel sounds. Genitourinary:           No tenderness over the bladder or bilateral CVAs. Extremities:               Without clubbing, cyanosis, or edema. Skin:                                    Normal color, texture, and turgor. No rashes, lesions, or jaundice. Pulses:                      Radial and dorsalis pedis pulses present 2+ bilaterally. Capillary refill <2s. Neurologic:                CN II-XII grossly intact and symmetrical.  
                                            Moving all four extremities well with no focal deficits. Psychiatric:                Pleasant demeanor, appropriate affect. Alert and oriented x 3 Lab/Data Review: 
 
Recent Results (from the past 24 hour(s)) GLUCOSE, POC Collection Time: 11/26/18  2:20 PM  
Result Value Ref Range Glucose (POC) 470 (HH) 65 - 100 mg/dL CBC WITH AUTOMATED DIFF Collection Time: 11/26/18  2:32 PM  
Result Value Ref Range WBC 7.4 4.3 - 11.1 K/uL  
 RBC 6.03 (H) 4.23 - 5.6 M/uL  
 HGB 16.4 13.6 - 17.2 g/dL HCT 48.8 41.1 - 50.3 % MCV 80.9 79.6 - 97.8 FL  
 MCH 27.2 26.1 - 32.9 PG  
 MCHC 33.6 31.4 - 35.0 g/dL  
 RDW 13.6 % PLATELET 194 294 - 117 K/uL MPV 13.3 (H) 9.4 - 12.3 FL ABSOLUTE NRBC 0.00 0.0 - 0.2 K/uL  
 DF AUTOMATED NEUTROPHILS 49 43 - 78 % LYMPHOCYTES 40 13 - 44 % MONOCYTES 8 4.0 - 12.0 % EOSINOPHILS 2 0.5 - 7.8 % BASOPHILS 1 0.0 - 2.0 % IMMATURE GRANULOCYTES 0 0.0 - 5.0 %  
 ABS. NEUTROPHILS 3.6 1.7 - 8.2 K/UL  
 ABS. LYMPHOCYTES 3.0 0.5 - 4.6 K/UL  
 ABS. MONOCYTES 0.6 0.1 - 1.3 K/UL  
 ABS. EOSINOPHILS 0.2 0.0 - 0.8 K/UL  
 ABS. BASOPHILS 0.0 0.0 - 0.2 K/UL  
 ABS. IMM. GRANS. 0.0 0.0 - 0.5 K/UL METABOLIC PANEL, COMPREHENSIVE Collection Time: 11/26/18  2:32 PM  
Result Value Ref Range Sodium 134 (L) 136 - 145 mmol/L Potassium 4.0 3.5 - 5.1 mmol/L Chloride 97 (L) 98 - 107 mmol/L  
 CO2 12 (L) 21 - 32 mmol/L Anion gap 25 (H) 7 - 16 mmol/L Glucose 433 (H) 65 - 100 mg/dL  BUN 13 6 - 23 MG/DL  
 Creatinine 1.06 0.8 - 1.5 MG/DL  
 GFR est AA >60 >60 ml/min/1.73m2 GFR est non-AA >60 >60 ml/min/1.73m2 Calcium 9.3 8.3 - 10.4 MG/DL Bilirubin, total 0.6 0.2 - 1.1 MG/DL  
 ALT (SGPT) 54 12 - 65 U/L  
 AST (SGOT) 22 15 - 37 U/L Alk. phosphatase 131 50 - 136 U/L Protein, total 8.4 (H) 6.3 - 8.2 g/dL Albumin 4.5 3.5 - 5.0 g/dL Globulin 3.9 (H) 2.3 - 3.5 g/dL A-G Ratio 1.2 1.2 - 3.5 EKG, 12 LEAD, INITIAL Collection Time: 11/26/18  3:40 PM  
Result Value Ref Range Ventricular Rate 98 BPM  
 Atrial Rate 98 BPM  
 P-R Interval 170 ms QRS Duration 102 ms Q-T Interval 350 ms QTC Calculation (Bezet) 446 ms Calculated P Axis 41 degrees Calculated R Axis 178 degrees Calculated T Axis 12 degrees Diagnosis    
  !! AGE AND GENDER SPECIFIC ECG ANALYSIS !! Normal sinus rhythm Right axis deviation Cannot rule out Anterior infarct , age undetermined Abnormal ECG No previous ECGs available Confirmed by ST KEITH TESFYAE MD (), NELI ZAPIEN ([id number]) on 11/26/2018 9:44:29 PM 
  
URINE MICROSCOPIC Collection Time: 11/26/18  4:54 PM  
Result Value Ref Range WBC 0-3 0 /hpf  
 RBC 0-3 0 /hpf Epithelial cells 0-3 0 /hpf Bacteria 0 0 /hpf Casts 0 0 /lpf Crystals, urine 0 0 /LPF Mucus 0 0 /lpf  
GLUCOSE, POC Collection Time: 11/26/18  5:27 PM  
Result Value Ref Range Glucose (POC) 367 (H) 65 - 100 mg/dL Judie Grace Collection Time: 11/26/18  5:34 PM  
Result Value Ref Range Glucose 357 mg/dL Insulin order 5.9 units/hour Insulin adminstered 5.9 units/hour Multiplier 0.020 Low target 150 mg/dL High target 250 mg/dL D50 order 0.0 ml  
 D50 administered 0.00 ml Minutes until next BG 60 min Order initials KDG Administered initials RC   
 GLSCOM Comments GLUCOSE, POC Collection Time: 11/26/18  6:30 PM  
Result Value Ref Range Glucose (POC) 339 (H) 65 - 100 mg/dL Judie Grace  Collection Time: 11/26/18  6:41 PM  
 Result Value Ref Range Glucose 339 mg/dL Insulin order 8.4 units/hour Insulin adminstered 8.4 units/hour Multiplier 0.030 Low target 150 mg/dL High target 250 mg/dL D50 order 0.0 ml  
 D50 administered 0.00 ml Minutes until next BG 60 min Order initials KD Administered initials RC   
 GLSCOM Comments GLUCOSE, POC Collection Time: 11/26/18  7:23 PM  
Result Value Ref Range Glucose (POC) 326 (H) 65 - 100 mg/dL [facility] Collection Time: 11/26/18  7:27 PM  
Result Value Ref Range Glucose 326 mg/dL Insulin order 5.3 units/hour Insulin adminstered 5.3 units/hour Multiplier 0.020 Low target 150 mg/dL High target 250 mg/dL D50 order 0.0 ml  
 D50 administered 0.00 ml Minutes until next BG 60 min Order initials Michellean Spar Administered initials JJ   
 GLSCOM Comments GLUCOSE, POC Collection Time: 11/26/18  8:37 PM  
Result Value Ref Range Glucose (POC) 304 (H) 65 - 100 mg/dL [facility] Collection Time: 11/26/18  8:38 PM  
Result Value Ref Range Glucose 304 mg/dL Insulin order 7.3 units/hour Insulin adminstered 7.3 units/hour Multiplier 0.030 Low target 150 mg/dL High target 250 mg/dL D50 order 0.0 ml  
 D50 administered 0.00 ml Minutes until next BG 60 min Order initials sw Administered initials sw GLSCOM Comments METABOLIC PANEL, BASIC Collection Time: 11/26/18  9:26 PM  
Result Value Ref Range Sodium 136 136 - 145 mmol/L Potassium 3.4 (L) 3.5 - 5.1 mmol/L Chloride 103 98 - 107 mmol/L  
 CO2 12 (L) 21 - 32 mmol/L Anion gap 21 (H) 7 - 16 mmol/L Glucose 275 (H) 65 - 100 mg/dL BUN 10 6 - 23 MG/DL Creatinine 0.98 0.8 - 1.5 MG/DL  
 GFR est AA >60 >60 ml/min/1.73m2 GFR est non-AA >60 >60 ml/min/1.73m2 Calcium 8.8 8.3 - 10.4 MG/DL MAGNESIUM Collection Time: 11/26/18  9:26 PM  
Result Value Ref Range Magnesium 1.8 1.8 - 2.4 mg/dL PHOSPHORUS  
 Collection Time: 11/26/18  9:26 PM  
Result Value Ref Range Phosphorus 2.0 (L) 2.5 - 4.5 MG/DL  
HEMOGLOBIN A1C WITH EAG Collection Time: 11/26/18  9:26 PM  
Result Value Ref Range Hemoglobin A1c 12.1 (H) 4.8 - 6.0 % Est. average glucose 301 mg/dL GLUCOSE, POC Collection Time: 11/26/18  9:44 PM  
Result Value Ref Range Glucose (POC) 285 (H) 65 - 100 mg/dL [facility] Collection Time: 11/26/18  9:44 PM  
Result Value Ref Range Glucose 285 mg/dL Insulin order 9.0 units/hour Insulin adminstered 9.0 units/hour Multiplier 0.040 Low target 150 mg/dL High target 250 mg/dL D50 order 0.0 ml  
 D50 administered 0.00 ml Minutes until next BG 60 min Order initials Christopher Baird Administered initials JJ   
 GLSCOM Comments GLUCOSE, POC Collection Time: 11/26/18 10:49 PM  
Result Value Ref Range Glucose (POC) 281 (H) 65 - 100 mg/dL [facility] Collection Time: 11/26/18 10:51 PM  
Result Value Ref Range Glucose 281 mg/dL Insulin order 11.1 units/hour Insulin adminstered 11.1 units/hour Multiplier 0.050 Low target 150 mg/dL High target 250 mg/dL D50 order 0.0 ml  
 D50 administered 0.00 ml Minutes until next BG 60 min Order initials Christopher Baird Administered initials JJ   
 GLSCOM Comments GLUCOSE, POC Collection Time: 11/26/18 11:54 PM  
Result Value Ref Range Glucose (POC) 203 (H) 65 - 100 mg/dL [facility] Collection Time: 11/26/18 11:55 PM  
Result Value Ref Range Glucose 203 mg/dL Insulin order 7.2 units/hour Insulin adminstered 7.2 units/hour Multiplier 0.050 Low target 150 mg/dL High target 250 mg/dL D50 order 0.0 ml  
 D50 administered 0.00 ml Minutes until next BG 60 min Order initials Christopher Baird Administered initials JJ   
 GLSCOM Comments GLUCOSE, POC Collection Time: 11/27/18 12:49 AM  
Result Value Ref Range Glucose (POC) 182 (H) 65 - 100 mg/dL Berle Gentleman  
 Collection Time: 11/27/18 12:50 AM  
Result Value Ref Range Glucose 182 mg/dL Insulin order 6.1 units/hour Insulin adminstered 6.1 units/hour Multiplier 0.050 Low target 150 mg/dL High target 250 mg/dL D50 order 0.0 ml  
 D50 administered 0.00 ml Minutes until next BG 60 min Order initials Inez Dodge Administered initials JJ   
 GLSCOM Comments METABOLIC PANEL, BASIC Collection Time: 11/27/18  1:43 AM  
Result Value Ref Range Sodium 135 (L) 136 - 145 mmol/L Potassium 3.4 (L) 3.5 - 5.1 mmol/L Chloride 104 98 - 107 mmol/L  
 CO2 15 (L) 21 - 32 mmol/L Anion gap 16 7 - 16 mmol/L Glucose 180 (H) 65 - 100 mg/dL BUN 9 6 - 23 MG/DL Creatinine 0.88 0.8 - 1.5 MG/DL  
 GFR est AA >60 >60 ml/min/1.73m2 GFR est non-AA >60 >60 ml/min/1.73m2 Calcium 8.8 8.3 - 10.4 MG/DL MAGNESIUM Collection Time: 11/27/18  1:43 AM  
Result Value Ref Range Magnesium 1.6 (L) 1.8 - 2.4 mg/dL GLUCOSE, POC Collection Time: 11/27/18  1:52 AM  
Result Value Ref Range Glucose (POC) 179 (H) 65 - 100 mg/dL [facility] [place] Collection Time: 11/27/18  1:52 AM  
Result Value Ref Range Glucose 179 mg/dL Insulin order 6.0 units/hour Insulin adminstered 6.0 units/hour Multiplier 0.050 Low target 150 mg/dL High target 250 mg/dL D50 order 0.0 ml  
 D50 administered 0.00 ml Minutes until next BG 60 min Order initials Inez Echo Administered initials JJ   
 GLSCOM Comments GLUCOSE, POC Collection Time: 11/27/18  2:48 AM  
Result Value Ref Range Glucose (POC) 163 (H) 65 - 100 mg/dL Sharlene [place] Collection Time: 11/27/18  2:49 AM  
Result Value Ref Range Glucose 163 mg/dL Insulin order 5.2 units/hour Insulin adminstered 5.2 units/hour Multiplier 0.050 Low target 150 mg/dL High target 250 mg/dL D50 order 0.0 ml  
 D50 administered 0.00 ml Minutes until next BG 60 min Order initials Inez Dodge  Administered initials Inez Dodge   
 GLSCOM Comments GLUCOSE, POC Collection Time: 11/27/18  3:48 AM  
Result Value Ref Range Glucose (POC) 159 (H) 65 - 100 mg/dL Audrey Dick Collection Time: 11/27/18  3:48 AM  
Result Value Ref Range Glucose 159 mg/dL Insulin order 5.0 units/hour Insulin adminstered 5.0 units/hour Multiplier 0.050 Low target 150 mg/dL High target 250 mg/dL D50 order 0.0 ml  
 D50 administered 0.00 ml Minutes until next BG 60 min Order initials [place] Core Administered initials JJ   
 GLSCOM Comments GLUCOSE, POC Collection Time: 11/27/18  4:46 AM  
Result Value Ref Range Glucose (POC) 158 (H) 65 - 100 mg/dL Audrey Dick Collection Time: 11/27/18  4:47 AM  
Result Value Ref Range Glucose 158 mg/dL Insulin order 4.9 units/hour Insulin adminstered 4.9 units/hour Multiplier 0.050 Low target 150 mg/dL High target 250 mg/dL D50 order 0.0 ml  
 D50 administered 0.00 ml Minutes until next BG 60 min Order initials [place] Core Administered initials JJ   
 GLSCOM Comments METABOLIC PANEL, BASIC Collection Time: 11/27/18  5:43 AM  
Result Value Ref Range Sodium 134 (L) 136 - 145 mmol/L Potassium 3.4 (L) 3.5 - 5.1 mmol/L Chloride 103 98 - 107 mmol/L  
 CO2 13 (L) 21 - 32 mmol/L Anion gap 18 (H) 7 - 16 mmol/L Glucose 170 (H) 65 - 100 mg/dL BUN 9 6 - 23 MG/DL Creatinine 0.75 (L) 0.8 - 1.5 MG/DL  
 GFR est AA >60 >60 ml/min/1.73m2 GFR est non-AA >60 >60 ml/min/1.73m2 Calcium 8.8 8.3 - 10.4 MG/DL MAGNESIUM Collection Time: 11/27/18  5:43 AM  
Result Value Ref Range Magnesium 2.1 1.8 - 2.4 mg/dL GLUCOSE, POC Collection Time: 11/27/18  5:45 AM  
Result Value Ref Range Glucose (POC) 152 (H) 65 - 100 mg/dL Audrey Dicker Collection Time: 11/27/18  5:47 AM  
Result Value Ref Range Glucose 152 mg/dL Insulin order 4.6 units/hour Insulin adminstered 4.6 units/hour Multiplier 0.050 Low target 150 mg/dL High target 250 mg/dL D50 order 0.0 ml  
 D50 administered 0.00 ml Minutes until next  min Order initials Everrett Dura Administered initials JJ   
 GLSCOM Comments GLUCOSE, POC Collection Time: 11/27/18  6:42 AM  
Result Value Ref Range Glucose (POC) 200 (H) 65 - 100 mg/dL [facility] Collection Time: 11/27/18  6:43 AM  
Result Value Ref Range Glucose 200 mg/dL Insulin order 7.0 units/hour Insulin adminstered 7.0 units/hour Multiplier 0.050 Low target 150 mg/dL High target 250 mg/dL D50 order 0.0 ml  
 D50 administered 0.00 ml Minutes until next  min Order initials cd Administered initials cd GLSCOM Comments GLUCOSE, POC Collection Time: 11/27/18  8:02 AM  
Result Value Ref Range Glucose (POC) 227 (H) 65 - 100 mg/dL [facility] Collection Time: 11/27/18  8:02 AM  
Result Value Ref Range Glucose 227 mg/dL Insulin order 8.4 units/hour Insulin adminstered 8.4 units/hour Multiplier 0.050 Low target 150 mg/dL High target 250 mg/dL D50 order 0.0 ml  
 D50 administered 0.00 ml Minutes until next  min Order initials cd Administered initials cd GLSCOM Comments METABOLIC PANEL, BASIC Collection Time: 11/27/18  8:53 AM  
Result Value Ref Range Sodium 134 (L) 136 - 145 mmol/L Potassium 3.3 (L) 3.5 - 5.1 mmol/L Chloride 103 98 - 107 mmol/L  
 CO2 15 (L) 21 - 32 mmol/L Anion gap 16 7 - 16 mmol/L Glucose 234 (H) 65 - 100 mg/dL BUN 8 6 - 23 MG/DL Creatinine 0.88 0.8 - 1.5 MG/DL  
 GFR est AA >60 >60 ml/min/1.73m2 GFR est non-AA >60 >60 ml/min/1.73m2 Calcium 8.4 8.3 - 10.4 MG/DL MAGNESIUM Collection Time: 11/27/18  8:53 AM  
Result Value Ref Range Magnesium 1.9 1.8 - 2.4 mg/dL GLUCOSE, POC Collection Time: 11/27/18  9:19 AM  
Result Value Ref Range Glucose (POC) 214 (H) 65 - 100 mg/dL Bianca Waters  Collection Time: 11/27/18  9:20 AM  
 Result Value Ref Range Glucose 214 mg/dL Insulin order 7.7 units/hour Insulin adminstered 7.7 units/hour Multiplier 0.050 Low target 150 mg/dL High target 250 mg/dL D50 order 0.0 ml  
 D50 administered 0.00 ml Minutes until next  min Order initials cd Administered initials cd GLSCOM Comments GLUCOSE, POC Collection Time: 11/27/18 10:51 AM  
Result Value Ref Range Glucose (POC) 206 (H) 65 - 100 mg/dL Brandin Pinna Collection Time: 11/27/18 10:51 AM  
Result Value Ref Range Glucose 206 mg/dL Insulin order 7.3 units/hour Insulin adminstered 7.3 units/hour Multiplier 0.050 Low target 150 mg/dL High target 250 mg/dL D50 order 0.0 ml  
 D50 administered 0.00 ml Minutes until next  min Order initials cd Administered initials cd GLSCOM Comments Current Facility-Administered Medications:  
  dextrose 5 % - 0.45% NaCl infusion, 150 mL/hr, IntraVENous, CONTINUOUS, Marcela Capellan MD, Last Rate: 150 mL/hr at 11/27/18 1115, 150 mL/hr at 11/27/18 1115   potassium chloride 20 mEq in 100 ml IVPB, 20 mEq, IntraVENous, Q2H, Laura Coronado DO, Last Rate: 50 mL/hr at 11/27/18 1000, 20 mEq at 11/27/18 1000 
  insulin regular (NOVOLIN R, HUMULIN R) 100 Units in 0.9% sodium chloride 100 mL infusion, 1-10 Units/hr, IntraVENous, TITRATE, Julio César Sherri, DO, Last Rate: 7.3 mL/hr at 11/27/18 1052, 7.3 Units/hr at 11/27/18 1052   dextrose 40% (GLUTOSE) oral gel 1 Tube, 15 g, Oral, PRN, Julio César Deter, DO 
  glucagon (GLUCAGEN) injection 1 mg, 1 mg, IntraMUSCular, PRN, Julio César Deter, DO 
  dextrose (D50W) injection syrg 12.5-25 g, 25-50 mL, IntraVENous, PRN, Julio César Deter, DO 
  acetaminophen (TYLENOL) tablet 650 mg, 650 mg, Oral, Q4H PRN, Julio César Deter, DO 
  HYDROcodone-acetaminophen (NORCO) 5-325 mg per tablet 1 Tab, 1 Tab, Oral, Q4H PRN, Julio César Deter, DO 
   naloxone (NARCAN) injection 0.4 mg, 0.4 mg, IntraVENous, PRN, Tee Mccrary DO 
  enoxaparin (LOVENOX) injection 40 mg, 40 mg, SubCUTAneous, Q12H, Tee Mccrary DO, 40 mg at 11/27/18 5192   hydrALAZINE (APRESOLINE) 20 mg/mL injection 10 mg, 10 mg, IntraVENous, Q6H PRN, Jacquie Scheuermann, MD, 10 mg at 11/26/18 2002   pantoprazole (PROTONIX) 40 mg in sodium chloride 0.9% 10 mL injection, 40 mg, IntraVENous, Q12H, Jacquie Scheuermann, MD, 40 mg at 11/27/18 0818 
  NUTRITIONAL SUPPORT ELECTROLYTE PRN ORDERS, , Does Not Apply, PRN, Teresa Jackson MD 
 
 
Assessment:  
 
Principal Problem: 
  DKA (diabetic ketoacidoses) ([facility] [address] (11/26/2018) Active Problems: 
  Type II diabetes mellitus, uncontrolled ([facility] [address] (11/26/2018) Morbid obesity ([facility] [address] (11/26/2018) Acute metabolic encephalopathy (76/60/4757) Plan:  
 
DKA BS improving compared to admission time. Still with AG and non controlled BS yet. Continue IV insulin drip. Continue IV fluid with 5% dextrose/0.45% NaCl at 150 mL/h Check BMP next time. Will decide on SC regimen later if improved further. I emphasized with patient the importance of compliance with treatment. Acute metabolic encephalopathy From DKA 
 improving. Monitor. Obesity Will need to  more regarding diet choice and life styles. I have discussed the plan of care with patient. DVT prophylaxis : lovenox Signed By: Kalpesh Gupta MD   
 November 27, 2018

## 2018-11-27 NOTE — PROGRESS NOTES
Patient continues to c/o acid reflux. Pt has been drinking a lot of fluids on an empty stomach. Protonix given. Pt advised to slow down on PO intake.

## 2018-11-27 NOTE — PROGRESS NOTES
Asked Dr. Shweta Funk about nutritional support orders and electrolyte replacement orders if needed. Ok to obtain.

## 2018-11-27 NOTE — DIABETES MGMT
Patient admitted with DKA. Admitting blood glucose 470. HbA1c 12.1 (eAG 301). Pt received Regular insulin 10 units. Pt started on GlucoStabilizer and insulin gtt. This AM blood glucose 200. Creatinine 0.75. K+ 3.4. Anion gap 18. Current FSBS 214. Last anion gap 16. Creatinine 0.88. K+ 3.3. Pt states he was diagnosed in January and voices a positive family history of diabetes, stating \"my grandmother is diabetic. \" Pt states he has been \"on and off\" insulin since diagnosis. Pt unable to state when he last checked his blood glucose, stating \"I had a meter, but I couldn't get the lancet to work. \" Pt states he no longer has a meter, but his grandmother told him about the [employer] glucometer. Pt verbalizes he was using insulin pens. Pt states he goes to [employer] and had recently went to [facility] for extra credit and did not take any insulin. Pt states he has not had any insulin since October. Pt states he was previously on Lantus and Humalog. Per PCP note pt received Metformin prescription, however pt states he got mad at the pharmacy because they were closed during lunch so he never went back to pick his Metformin. Per PCP note pt was previously on Lantus 40 units twice a day and Humalog 20 units with meals. Pt given educational material, \"Diabetes Self-Management: A Patient Teaching Guide\", which was reviewed with pt. Explained basic physiology of diabetes, as well as causes, s/s, and treatments for hypoglycemia and hyperglycemia. Described the effects of poor glycemic control on the development of long-term complications such as renal, eye, nerve, and cardiovascular disease. Pt states he was having blurry vision at home. Described proper diabetic foot care and the importance of checking feet qd. Pt denies numbness and tingling in his feet. Educated re: effects of carbohydrates on blood glucose, the \"plate method\" of healthy meal planning, basics of healthy meal plan, and Consistent Carbohydrate Diet. Pt states he normally skips breakfast, eats [employer] for lunch, individual bag chips for snacks, and lately he has been eating breakfast bowls for dinner \"or whatever I have. \" Noted pt currently has diet pepsi at bedside. Pt states he would also like to speak to a nutritionist. Consult placed. Also explained the relationship between hyperglycemia and infection. Educated pt on the importance of physical activity. Pt states he plays golf, but has not played recently. Discussed target goals for blood glucose and A1C. Pt verbalizes understanding of teaching. Explained the importance of blood glucose monitoring. Recommended frequency of qid ac, hs, and to record in log book to take to PCP appointment. Pt states he cannot get through to his primary care provider and would like a list of other PCPs. Case management aware. Provided blood glucose meter, strips, and instructed pt in use of meter. Pt verbalizes that he is comfortable using glucometer. Patient instructed re: preparation and injection of insulin dose. Nursing will continue to have patient practice insulin self-injection when insulin dose is due. Patient also verbalizes understanding of site rotation, storage of insulin, and proper sharps disposal.   
 
Patient would likely benefit from continued diabetes outpatient education. Patient provided with contact information to [facility] program to pursue at their convenience after discharge with their PCP. Patient states he would like to go to [address] [place] [address]  diabetic management classes.

## 2018-11-28 LAB
ANION GAP SERPL CALC-SCNC: 14 MMOL/L (ref 7–16)
ANION GAP SERPL CALC-SCNC: 16 MMOL/L (ref 7–16)
BUN SERPL-MCNC: 5 MG/DL (ref 6–23)
BUN SERPL-MCNC: 6 MG/DL (ref 6–23)
CALCIUM SERPL-MCNC: 8.2 MG/DL (ref 8.3–10.4)
CALCIUM SERPL-MCNC: 8.3 MG/DL (ref 8.3–10.4)
CHLORIDE SERPL-SCNC: 104 MMOL/L (ref 98–107)
CHLORIDE SERPL-SCNC: 105 MMOL/L (ref 98–107)
CO2 SERPL-SCNC: 15 MMOL/L (ref 21–32)
CO2 SERPL-SCNC: 16 MMOL/L (ref 21–32)
CREAT SERPL-MCNC: 0.73 MG/DL (ref 0.8–1.5)
CREAT SERPL-MCNC: 0.77 MG/DL (ref 0.8–1.5)
GLUCOSE BLD STRIP.AUTO-MCNC: 264 MG/DL (ref 65–100)
GLUCOSE BLD STRIP.AUTO-MCNC: 268 MG/DL (ref 65–100)
GLUCOSE BLD STRIP.AUTO-MCNC: 288 MG/DL (ref 65–100)
GLUCOSE BLD STRIP.AUTO-MCNC: 346 MG/DL (ref 65–100)
GLUCOSE SERPL-MCNC: 281 MG/DL (ref 65–100)
GLUCOSE SERPL-MCNC: 349 MG/DL (ref 65–100)
MAGNESIUM SERPL-MCNC: 1.8 MG/DL (ref 1.8–2.4)
MAGNESIUM SERPL-MCNC: 1.9 MG/DL (ref 1.8–2.4)
PHOSPHATE SERPL-MCNC: 1.8 MG/DL (ref 2.5–4.5)
POTASSIUM SERPL-SCNC: 3.5 MMOL/L (ref 3.5–5.1)
POTASSIUM SERPL-SCNC: 3.8 MMOL/L (ref 3.5–5.1)
SODIUM SERPL-SCNC: 135 MMOL/L (ref 136–145)
SODIUM SERPL-SCNC: 135 MMOL/L (ref 136–145)

## 2018-11-28 PROCEDURE — 83735 ASSAY OF MAGNESIUM: CPT

## 2018-11-28 PROCEDURE — 74011000250 HC RX REV CODE- 250: Performed by: FAMILY MEDICINE

## 2018-11-28 PROCEDURE — 82962 GLUCOSE BLOOD TEST: CPT

## 2018-11-28 PROCEDURE — 74011250637 HC RX REV CODE- 250/637: Performed by: INTERNAL MEDICINE

## 2018-11-28 PROCEDURE — 80048 BASIC METABOLIC PNL TOTAL CA: CPT

## 2018-11-28 PROCEDURE — 74011636637 HC RX REV CODE- 636/637: Performed by: INTERNAL MEDICINE

## 2018-11-28 PROCEDURE — 36415 COLL VENOUS BLD VENIPUNCTURE: CPT

## 2018-11-28 PROCEDURE — 77030020263 HC SOL INJ SOD CL0.9% LFCR 1000ML

## 2018-11-28 PROCEDURE — 74011636637 HC RX REV CODE- 636/637: Performed by: FAMILY MEDICINE

## 2018-11-28 PROCEDURE — 74011250636 HC RX REV CODE- 250/636: Performed by: INTERNAL MEDICINE

## 2018-11-28 PROCEDURE — 84100 ASSAY OF PHOSPHORUS: CPT

## 2018-11-28 PROCEDURE — 74011250636 HC RX REV CODE- 250/636: Performed by: FAMILY MEDICINE

## 2018-11-28 PROCEDURE — 65270000029 HC RM PRIVATE

## 2018-11-28 RX ORDER — INSULIN LISPRO 100 [IU]/ML
INJECTION, SOLUTION INTRAVENOUS; SUBCUTANEOUS
Status: DISCONTINUED | OUTPATIENT
Start: 2018-11-28 | End: 2018-11-30 | Stop reason: HOSPADM

## 2018-11-28 RX ORDER — INSULIN LISPRO 100 [IU]/ML
10 INJECTION, SOLUTION INTRAVENOUS; SUBCUTANEOUS
Status: DISCONTINUED | OUTPATIENT
Start: 2018-11-28 | End: 2018-11-28

## 2018-11-28 RX ORDER — INSULIN LISPRO 100 [IU]/ML
INJECTION, SOLUTION INTRAVENOUS; SUBCUTANEOUS
Status: DISCONTINUED | OUTPATIENT
Start: 2018-11-28 | End: 2018-11-28

## 2018-11-28 RX ORDER — INSULIN GLARGINE 100 [IU]/ML
50 INJECTION, SOLUTION SUBCUTANEOUS EVERY 12 HOURS
Status: DISCONTINUED | OUTPATIENT
Start: 2018-11-28 | End: 2018-11-30 | Stop reason: HOSPADM

## 2018-11-28 RX ORDER — INSULIN LISPRO 100 [IU]/ML
10 INJECTION, SOLUTION INTRAVENOUS; SUBCUTANEOUS
Status: DISCONTINUED | OUTPATIENT
Start: 2018-11-28 | End: 2018-11-29

## 2018-11-28 RX ORDER — INSULIN GLARGINE 100 [IU]/ML
50 INJECTION, SOLUTION SUBCUTANEOUS EVERY 12 HOURS
Status: DISCONTINUED | OUTPATIENT
Start: 2018-11-28 | End: 2018-11-28

## 2018-11-28 RX ORDER — INSULIN GLARGINE 100 [IU]/ML
10 INJECTION, SOLUTION SUBCUTANEOUS ONCE
Status: COMPLETED | OUTPATIENT
Start: 2018-11-28 | End: 2018-11-28

## 2018-11-28 RX ORDER — LISINOPRIL 5 MG/1
5 TABLET ORAL DAILY
Status: DISCONTINUED | OUTPATIENT
Start: 2018-11-28 | End: 2018-11-30 | Stop reason: HOSPADM

## 2018-11-28 RX ADMIN — INSULIN LISPRO 8 UNITS: 100 INJECTION, SOLUTION INTRAVENOUS; SUBCUTANEOUS at 11:40

## 2018-11-28 RX ADMIN — INSULIN LISPRO 10 UNITS: 100 INJECTION, SOLUTION INTRAVENOUS; SUBCUTANEOUS at 17:07

## 2018-11-28 RX ADMIN — INSULIN LISPRO 6 UNITS: 100 INJECTION, SOLUTION INTRAVENOUS; SUBCUTANEOUS at 22:00

## 2018-11-28 RX ADMIN — INSULIN LISPRO 6 UNITS: 100 INJECTION, SOLUTION INTRAVENOUS; SUBCUTANEOUS at 17:07

## 2018-11-28 RX ADMIN — INSULIN GLARGINE 50 UNITS: 100 INJECTION, SOLUTION SUBCUTANEOUS at 21:47

## 2018-11-28 RX ADMIN — INSULIN GLARGINE 40 UNITS: 100 INJECTION, SOLUTION SUBCUTANEOUS at 09:51

## 2018-11-28 RX ADMIN — INSULIN LISPRO 10 UNITS: 100 INJECTION, SOLUTION INTRAVENOUS; SUBCUTANEOUS at 11:41

## 2018-11-28 RX ADMIN — ENOXAPARIN SODIUM 40 MG: 40 INJECTION, SOLUTION INTRAVENOUS; SUBCUTANEOUS at 05:57

## 2018-11-28 RX ADMIN — LISINOPRIL 5 MG: 5 TABLET ORAL at 14:50

## 2018-11-28 RX ADMIN — ENOXAPARIN SODIUM 40 MG: 40 INJECTION, SOLUTION INTRAVENOUS; SUBCUTANEOUS at 17:10

## 2018-11-28 RX ADMIN — SODIUM CHLORIDE 100 ML/HR: 900 INJECTION, SOLUTION INTRAVENOUS at 03:58

## 2018-11-28 RX ADMIN — INSULIN GLARGINE 10 UNITS: 100 INJECTION, SOLUTION SUBCUTANEOUS at 14:05

## 2018-11-28 RX ADMIN — INSULIN HUMAN 9 UNITS: 100 INJECTION, SOLUTION PARENTERAL at 08:05

## 2018-11-28 RX ADMIN — SODIUM CHLORIDE 100 ML/HR: 900 INJECTION, SOLUTION INTRAVENOUS at 14:55

## 2018-11-28 RX ADMIN — SODIUM CHLORIDE: 900 INJECTION, SOLUTION INTRAVENOUS at 08:08

## 2018-11-28 NOTE — PROGRESS NOTES
Pt lying quietly in bed, in NAD. A&Ox4. Lungs clear. O2 Sat 99% on RA, RR even and unlabored. NSR on monitor. BP stable. Abd soft, bowel sounds present. LBM prior to admit. Appetite good. Voiding without problems, UOP good. Denies any complaints at this time. Call light in reach. Off insulin gtt since last night ~2000. BG still in 200s range. Lab results noted this am-- AG still closed but back up to 16. Will discuss with MD today.

## 2018-11-28 NOTE — PROGRESS NOTES
TRANSFER - OUT REPORT: 
 
Verbal report given to Justin Flannery RN on Clare Knox  being transferred to [phone] for routine progression of care Report consisted of patients Situation, Background, Assessment and  
Recommendations(SBAR). Information from the following report(s) SBAR, Kardex, ED Summary, Intake/Output, MAR, Recent Results and Cardiac Rhythm NSR was reviewed with the receiving nurse. Lines:  
Peripheral IV 11/26/18 Right Hand (Active) Site Assessment Clean, dry, & intact 11/28/2018  8:00 AM  
Phlebitis Assessment 0 11/28/2018  8:00 AM  
Infiltration Assessment 0 11/28/2018  8:00 AM  
Dressing Status Clean, dry, & intact 11/28/2018  8:00 AM  
Dressing Type Tape;Transparent 11/28/2018  8:00 AM  
Hub Color/Line Status Blue;Capped;Flushed;Patent 11/28/2018 10:30 AM  
Alcohol Cap Used No 11/28/2018  8:00 AM  
   
Peripheral IV 11/28/18 Left Wrist (Active) Site Assessment Clean, dry, & intact 11/28/2018 10:30 AM  
Phlebitis Assessment 0 11/28/2018 10:30 AM  
Infiltration Assessment 0 11/28/2018 10:30 AM  
Dressing Status Clean, dry, & intact 11/28/2018 10:30 AM  
Dressing Type Tape;Transparent 11/28/2018 10:30 AM  
Hub Color/Line Status Pink; Infusing;Flushed;Patent 11/28/2018 10:30 AM  
Alcohol Cap Used No 11/28/2018 10:30 AM  
  
 
Opportunity for questions and clarification was provided. Patient transported with: 
 [facility] [employer]

## 2018-11-28 NOTE — DIABETES MGMT
. Reviewed current insulin regimen: Lantus 40 units every 12 hours, Humalog 10 units 3x/day ac and Humalog sliding scale coverage 4x/day ac and hs, including type of insulin, timing with meals, onset, peak of action and duration of effect. Pt verbalizes understanding. Discussed the importance of blood glucose monitoring and recommended checking qid, ac and hs and to record in provided log book to bring to PCP appointments for assistance with medication titration. Stressed the importance of follow up care for diabetes management with PCP. Pt has been given contact information for [facility] diabetes program. Pt has no further questions at this time.

## 2018-11-28 NOTE — PROGRESS NOTES
Bedside and Verbal shift change report given to Lindsay Garner RN (oncoming nurse) by Сергей Martinez (offgoing nurse). Report included the following information SBAR, Kardex, Intake/Output, MAR, Recent Results, Med Rec Status and Cardiac Rhythm NSR.

## 2018-11-28 NOTE — INTERDISCIPLINARY ROUNDS
Interdisciplinary team rounds were held 11/28/2018 with the following team members:Care Management, Nursing, Nurse Practitioner, Nutrition, Palliative Care, Pastoral Care, Pharmacy, Physical Therapy, Physician, Respiratory Therapy and Clinical Coordinator and the patient. Plan of care discussed. See clinical pathway and/or care plan for interventions and desired outcomes.

## 2018-11-28 NOTE — PROGRESS NOTES
Pt sitting up in recliner, watching tv, in NAD. BG in 300 range at lunch. Additional dose of lantus 10 units x1 dose ordered per MD and given. Pt on premeal insulin 3x/day with meals and SSI 4x/day. Pt has very good appetite and ate all of breakfast and lunch. Has asked for snacks and diet pepsi's multiple times throughout the day as well. Hypertensive, -170s/100s. NSR/ST, HR 98-100s on monitor. Pt states \"I was on a blood pressure medicine at one time and something happened with my doctor I was seeing and I don't know why it was stopped. If I checked my blood pressure sometimes and it was high, I would just take one of my grandma's bp meds. \"  Paged and discussed pt's htn with Dr. Tawnya Mcneil. Orders to be place by MD. Discussion held with pt regarding importance of compliance with medical management of his DM and HTN. Encouraged pt to keep log of blood glucose and BP checks to take when he has his doctor appts. Reviewed s/s of stroke. Encouraged pt to start taking steps towards doing things to decrease stress and to improve his overall health (i.e. Exercise, diet choices). Pt verbalizes understanding of all the above. Appreciative of conversation.

## 2018-11-28 NOTE — PROGRESS NOTES
Pt re-seen to discuss PCP. States he following Dr. Dinesh Tinajero in [place], which was Pediatrician, but still remained in contact and followed. With [facility] and residence of [place], pt would be better to find PCP in [place] and this was discussed. States he will check with Dr. Dinesh Tinajero for recommendations of PCP.

## 2018-11-28 NOTE — PROGRESS NOTES
Problem: Falls - Risk of 
Goal: *Absence of Falls Document Trent Swanson Fall Risk and appropriate interventions in the flowsheet. Outcome: Progressing Towards Goal 
Fall Risk Interventions: 
Mobility Interventions: Assess mobility with egress test, Bed/chair exit alarm, Patient to call before getting OOB, Strengthening exercises (ROM-active/passive) Medication Interventions: Bed/chair exit alarm, Evaluate medications/consider consulting pharmacy, Patient to call before getting OOB, Teach patient to arise slowly

## 2018-11-28 NOTE — PROGRESS NOTES
TRANSFER - IN REPORT: 
 
Verbal report received from Lesa Presmaria fernanda, RN(name) on [employer]  being received from CCU(unit) for routine progression of care Report consisted of patients Situation, Background, Assessment and  
Recommendations(SBAR). Information from the following report(s) SBAR was reviewed with the receiving nurse. Opportunity for questions and clarification was provided. Assessment completed upon patients arrival to unit and care assumed.

## 2018-11-28 NOTE — PROGRESS NOTES
Bedside and Verbal shift change report given to Toby Still RN (oncoming nurse) by Sam Flanagan (offgoing nurse). Report included the following information SBAR, Kardex, Intake/Output, MAR, Recent Results, Med Rec Status and Cardiac Rhythm NSR.

## 2018-11-28 NOTE — PROGRESS NOTES
Progress Note Patient: Trina Austin MRN: [id number]  SSN: [id number] YOB: 2000  Age: 19 y.o. Sex: male Admit Date: 11/26/2018 LOS: 2 days Subjective:  
 
DM type 2, on insulin, obesity. Patient stated that he lost his medications piror to admission. Came with hyperglycemia and acute metabolic encephalopathy. BS is 200's ranges. Patient has been eating well and has no new complaints. Objective:  
 
Vitals:  
 11/28/18 1001 11/28/18 1102 11/28/18 1141 11/28/18 1145 BP: 158/74 (!) 136/91 Pulse: 95 93  92 Resp: 20 18  (!) 57 Temp:    98.4 °F (36.9 °C) SpO2: 100% 100% 99% Weight:      
Height:      
  
 
Intake and Output: 
Current Shift: 11/28 0701 - 11/28 1900 In: 600 [P.O.:600] Out: 1125 [XRC:6597] Last three shifts: 11/26 1901 - 11/28 0700 In: 7173.8 [P.O.:500; I.V.:6673.8] Out: 4850 [STTWL:9540] Physical Exam:  
 
General:                    The patient is a young male in no acute distress. Obese. Eating. Head:                                   Normocephalic/atraumatic. Eyes:                                   No palpebral pallor or scleral icterus. ENT:                                    External auricular and nasal exam within normal limits. Mucous membranes are moist. 
Neck:                                   Supple, non-tender, no JVD. Lungs:                       Clear to auscultation bilaterally without wheezes or crackles. No respiratory distress or accessory muscle use. Heart:                                  Regular rate and rhythm, without murmurs, rubs, or gallops. Abdomen:                  Soft, non-tender, distended due to obesity with normoactive bowel sounds. Genitourinary:           No tenderness over the bladder or bilateral CVAs. Extremities:               Without clubbing, cyanosis, or edema. Skin:                                    Normal color, texture, and turgor. No rashes, lesions, or jaundice. Pulses:                      Radial and dorsalis pedis pulses present 2+ bilaterally. Capillary refill <2s. Neurologic:                CN II-XII grossly intact and symmetrical.  
                                            Moving all four extremities well with no focal deficits. Psychiatric:                Pleasant demeanor, appropriate affect. Alert and oriented x 3 Lab/Data Review: 
 
Recent Results (from the past 24 hour(s)) GLUCOSE, POC Collection Time: 11/27/18  1:46 PM  
Result Value Ref Range Glucose (POC) 250 (H) 65 - 100 mg/dL Curly Ee Collection Time: 11/27/18  1:47 PM  
Result Value Ref Range Glucose 250 mg/dL Insulin order 9.5 units/hour Insulin adminstered 9.5 units/hour Multiplier 0.050 Low target 150 mg/dL High target 250 mg/dL D50 order 0.0 ml  
 D50 administered 0.00 ml Minutes until next  min Order initials cd Administered initials cd GLSCOM Comments GLUCOSE, POC Collection Time: 11/27/18  2:51 PM  
Result Value Ref Range Glucose (POC) 214 (H) 65 - 100 mg/dL Curly Ee Collection Time: 11/27/18  2:52 PM  
Result Value Ref Range Glucose 214 mg/dL Insulin order 7.7 units/hour Insulin adminstered 7.7 units/hour Multiplier 0.050 Low target 150 mg/dL High target 250 mg/dL D50 order 0.0 ml  
 D50 administered 0.00 ml Minutes until next  min Order initials cd Administered initials cd GLSCOM Comments GLUCOSE, POC Collection Time: 11/27/18  3:55 PM  
Result Value Ref Range Glucose (POC) 215 (H) 65 - 100 mg/dL Curly Ee Collection Time: 11/27/18  3:57 PM  
Result Value Ref Range Glucose 215 mg/dL Insulin order 7.8 units/hour Insulin adminstered 7.8 units/hour  Multiplier 0.050   
 Low target 150 mg/dL High target 250 mg/dL D50 order 0.0 ml  
 D50 administered 0.00 ml Minutes until next  min Order initials cd Administered initials cd GLSCOM Comments METABOLIC PANEL, BASIC Collection Time: 11/27/18  4:09 PM  
Result Value Ref Range Sodium 134 (L) 136 - 145 mmol/L Potassium 3.7 3.5 - 5.1 mmol/L Chloride 105 98 - 107 mmol/L  
 CO2 17 (L) 21 - 32 mmol/L Anion gap 12 7 - 16 mmol/L Glucose 224 (H) 65 - 100 mg/dL BUN 6 6 - 23 MG/DL Creatinine 0.76 (L) 0.8 - 1.5 MG/DL  
 GFR est AA >60 >60 ml/min/1.73m2 GFR est non-AA >60 >60 ml/min/1.73m2 Calcium 8.6 8.3 - 10.4 MG/DL MAGNESIUM Collection Time: 11/27/18  4:09 PM  
Result Value Ref Range Magnesium 1.7 (L) 1.8 - 2.4 mg/dL GLUCOSE, POC Collection Time: 11/27/18  5:00 PM  
Result Value Ref Range Glucose (POC) 216 (H) 65 - 100 mg/dL Simona Borne Collection Time: 11/27/18  5:02 PM  
Result Value Ref Range Glucose 216 mg/dL Insulin order 7.8 units/hour Insulin adminstered 7.8 units/hour Multiplier 0.050 Low target 150 mg/dL High target 250 mg/dL D50 order 0.0 ml  
 D50 administered 0.00 ml Minutes until next  min Order initials cd Administered initials cd GLSCOM Comments GLUCOSE, POC Collection Time: 11/27/18  9:20 PM  
Result Value Ref Range Glucose (POC) 292 (H) 65 - 100 mg/dL MAGNESIUM Collection Time: 11/28/18  3:27 AM  
Result Value Ref Range Magnesium 1.8 1.8 - 2.4 mg/dL METABOLIC PANEL, BASIC Collection Time: 11/28/18  3:27 AM  
Result Value Ref Range Sodium 135 (L) 136 - 145 mmol/L Potassium 3.5 3.5 - 5.1 mmol/L Chloride 104 98 - 107 mmol/L  
 CO2 15 (L) 21 - 32 mmol/L Anion gap 16 7 - 16 mmol/L Glucose 281 (H) 65 - 100 mg/dL BUN 6 6 - 23 MG/DL Creatinine 0.73 (L) 0.8 - 1.5 MG/DL  
 GFR est AA >60 >60 ml/min/1.73m2 GFR est non-AA >60 >60 ml/min/1.73m2 Calcium 8.2 (L) 8.3 - 10.4 MG/DL  
PHOSPHORUS Collection Time: 11/28/18  3:27 AM  
Result Value Ref Range Phosphorus 1.8 (L) 2.5 - 4.5 MG/DL  
GLUCOSE, POC Collection Time: 11/28/18  8:04 AM  
Result Value Ref Range Glucose (POC) 268 (H) 65 - 100 mg/dL METABOLIC PANEL, BASIC Collection Time: 11/28/18 10:38 AM  
Result Value Ref Range Sodium 135 (L) 136 - 145 mmol/L Potassium 3.8 3.5 - 5.1 mmol/L Chloride 105 98 - 107 mmol/L  
 CO2 16 (L) 21 - 32 mmol/L Anion gap 14 7 - 16 mmol/L Glucose 349 (H) 65 - 100 mg/dL BUN 5 (L) 6 - 23 MG/DL Creatinine 0.77 (L) 0.8 - 1.5 MG/DL  
 GFR est AA >60 >60 ml/min/1.73m2 GFR est non-AA >60 >60 ml/min/1.73m2 Calcium 8.3 8.3 - 10.4 MG/DL MAGNESIUM Collection Time: 11/28/18 10:38 AM  
Result Value Ref Range Magnesium 1.9 1.8 - 2.4 mg/dL GLUCOSE, POC Collection Time: 11/28/18 11:39 AM  
Result Value Ref Range Glucose (POC) 346 (H) 65 - 100 mg/dL Current Facility-Administered Medications:  
  potassium phosphate 30 mmol in 0.9% sodium chloride 250 mL infusion, , IntraVENous, ONCE, Vivian Coronado Carry, DO 
  insulin lispro (HUMALOG) injection 10 Units, 10 Units, SubCUTAneous, TIDAC, Marcela Capellan MD, 10 Units at 11/28/18 1141 
  insulin lispro (HUMALOG) injection, , SubCUTAneous, AC&HS, Susie Yakima, DO 
  0.9% sodium chloride infusion, 100 mL/hr, IntraVENous, CONTINUOUS, Marcela Capellan MD, Last Rate: 100 mL/hr at 11/28/18 0358, 100 mL/hr at 11/28/18 0358   insulin glargine (LANTUS) injection 40 Units, 40 Units, SubCUTAneous, Q12H, Marcela Capellan MD, 40 Units at 11/28/18 0710   acetaminophen (TYLENOL) tablet 650 mg, 650 mg, Oral, Q4H PRN, Susie Yakima, DO 
  HYDROcodone-acetaminophen (NORCO) 5-325 mg per tablet 1 Tab, 1 Tab, Oral, Q4H PRN, Susie Yakima, DO 
  naloxone [place]) injection 0.4 mg, 0.4 mg, IntraVENous, PRN, Susie Yakima, DO 
   enoxaparin (LOVENOX) injection 40 mg, 40 mg, SubCUTAneous, Q12H, Laura Coronado DO, 40 mg at 11/28/18 6507   hydrALAZINE (APRESOLINE) 20 mg/mL injection 10 mg, 10 mg, IntraVENous, Q6H PRN, Winnie Babin MD, 10 mg at 11/26/18 2002 
  NUTRITIONAL SUPPORT ELECTROLYTE PRN ORDERS, , Does Not Apply, PRN, Martha Vieira MD 
 
 
Assessment:  
 
Principal Problem: 
  DKA (diabetic ketoacidoses) ([facility] [address] (11/26/2018) Active Problems: 
  Type II diabetes mellitus, uncontrolled ([facility] [address] (11/26/2018) Morbid obesity ([facility] [address] (11/26/2018) Acute metabolic encephalopathy (81/10/1460) Plan:  
 
DKA BS improving compared to admission time. BS is etching up on SC regimen. I emphasized with patient the importance of compliance with treatment. Will increase SC Lantus. Acute metabolic encephalopathy From DKA 
 improving. Monitor. Obesity Will need to  more regarding diet choice and life styles. I have discussed the plan of care with patient. DVT prophylaxis : lovenox Move to floor and likely can be discharged tomorrow. Signed By: Ami Lopez MD   
 November 28, 2018

## 2018-11-29 LAB
ANION GAP SERPL CALC-SCNC: 9 MMOL/L (ref 7–16)
BUN SERPL-MCNC: 4 MG/DL (ref 6–23)
CALCIUM SERPL-MCNC: 8.1 MG/DL (ref 8.3–10.4)
CHLORIDE SERPL-SCNC: 107 MMOL/L (ref 98–107)
CO2 SERPL-SCNC: 24 MMOL/L (ref 21–32)
CREAT SERPL-MCNC: 0.56 MG/DL (ref 0.8–1.5)
GLUCOSE BLD STRIP.AUTO-MCNC: 224 MG/DL (ref 65–100)
GLUCOSE BLD STRIP.AUTO-MCNC: 227 MG/DL (ref 65–100)
GLUCOSE BLD STRIP.AUTO-MCNC: 243 MG/DL (ref 65–100)
GLUCOSE BLD STRIP.AUTO-MCNC: 283 MG/DL (ref 65–100)
GLUCOSE SERPL-MCNC: 221 MG/DL (ref 65–100)
POTASSIUM SERPL-SCNC: 3.1 MMOL/L (ref 3.5–5.1)
SODIUM SERPL-SCNC: 140 MMOL/L (ref 136–145)

## 2018-11-29 PROCEDURE — 74011636637 HC RX REV CODE- 636/637: Performed by: HOSPITALIST

## 2018-11-29 PROCEDURE — 74011636637 HC RX REV CODE- 636/637: Performed by: INTERNAL MEDICINE

## 2018-11-29 PROCEDURE — 82962 GLUCOSE BLOOD TEST: CPT

## 2018-11-29 PROCEDURE — 77030020263 HC SOL INJ SOD CL0.9% LFCR 1000ML

## 2018-11-29 PROCEDURE — 74011250636 HC RX REV CODE- 250/636: Performed by: FAMILY MEDICINE

## 2018-11-29 PROCEDURE — 74011250637 HC RX REV CODE- 250/637: Performed by: HOSPITALIST

## 2018-11-29 PROCEDURE — 80048 BASIC METABOLIC PNL TOTAL CA: CPT

## 2018-11-29 PROCEDURE — 74011250636 HC RX REV CODE- 250/636: Performed by: INTERNAL MEDICINE

## 2018-11-29 PROCEDURE — 65270000029 HC RM PRIVATE

## 2018-11-29 PROCEDURE — 74011636637 HC RX REV CODE- 636/637: Performed by: FAMILY MEDICINE

## 2018-11-29 PROCEDURE — 74011250637 HC RX REV CODE- 250/637: Performed by: INTERNAL MEDICINE

## 2018-11-29 RX ORDER — INSULIN LISPRO 100 [IU]/ML
15 INJECTION, SOLUTION INTRAVENOUS; SUBCUTANEOUS
Status: DISCONTINUED | OUTPATIENT
Start: 2018-11-29 | End: 2018-11-30 | Stop reason: HOSPADM

## 2018-11-29 RX ORDER — POTASSIUM CHLORIDE 20 MEQ/1
40 TABLET, EXTENDED RELEASE ORAL 2 TIMES DAILY
Status: DISCONTINUED | OUTPATIENT
Start: 2018-11-29 | End: 2018-11-30 | Stop reason: HOSPADM

## 2018-11-29 RX ADMIN — INSULIN GLARGINE 50 UNITS: 100 INJECTION, SOLUTION SUBCUTANEOUS at 21:51

## 2018-11-29 RX ADMIN — ENOXAPARIN SODIUM 40 MG: 40 INJECTION, SOLUTION INTRAVENOUS; SUBCUTANEOUS at 05:09

## 2018-11-29 RX ADMIN — SODIUM CHLORIDE 100 ML/HR: 900 INJECTION, SOLUTION INTRAVENOUS at 08:00

## 2018-11-29 RX ADMIN — INSULIN LISPRO 4 UNITS: 100 INJECTION, SOLUTION INTRAVENOUS; SUBCUTANEOUS at 07:42

## 2018-11-29 RX ADMIN — SODIUM CHLORIDE 100 ML/HR: 900 INJECTION, SOLUTION INTRAVENOUS at 15:53

## 2018-11-29 RX ADMIN — INSULIN LISPRO 15 UNITS: 100 INJECTION, SOLUTION INTRAVENOUS; SUBCUTANEOUS at 16:58

## 2018-11-29 RX ADMIN — INSULIN GLARGINE 50 UNITS: 100 INJECTION, SOLUTION SUBCUTANEOUS at 08:50

## 2018-11-29 RX ADMIN — INSULIN LISPRO 15 UNITS: 100 INJECTION, SOLUTION INTRAVENOUS; SUBCUTANEOUS at 12:05

## 2018-11-29 RX ADMIN — INSULIN LISPRO 10 UNITS: 100 INJECTION, SOLUTION INTRAVENOUS; SUBCUTANEOUS at 07:42

## 2018-11-29 RX ADMIN — INSULIN LISPRO 4 UNITS: 100 INJECTION, SOLUTION INTRAVENOUS; SUBCUTANEOUS at 22:00

## 2018-11-29 RX ADMIN — INSULIN LISPRO 6 UNITS: 100 INJECTION, SOLUTION INTRAVENOUS; SUBCUTANEOUS at 16:58

## 2018-11-29 RX ADMIN — LISINOPRIL 5 MG: 5 TABLET ORAL at 08:50

## 2018-11-29 RX ADMIN — POTASSIUM CHLORIDE 40 MEQ: 20 TABLET, EXTENDED RELEASE ORAL at 14:42

## 2018-11-29 RX ADMIN — POTASSIUM CHLORIDE 40 MEQ: 20 TABLET, EXTENDED RELEASE ORAL at 21:52

## 2018-11-29 RX ADMIN — ENOXAPARIN SODIUM 40 MG: 40 INJECTION, SOLUTION INTRAVENOUS; SUBCUTANEOUS at 17:29

## 2018-11-29 RX ADMIN — INSULIN LISPRO 4 UNITS: 100 INJECTION, SOLUTION INTRAVENOUS; SUBCUTANEOUS at 12:06

## 2018-11-29 NOTE — PROGRESS NOTES
Date of Outreach Update: 
Xu Arce was seen and assessed. MEWS Score: 1 (11/29/18 1524) Vitals:  
 11/29/18 0342 11/29/18 0818 11/29/18 1111 11/29/18 1524 BP: 146/85 145/73 143/77 146/84 Pulse: 76 93 90 88 Resp: 20 20 20 20 Temp: 98.1 °F (36.7 °C) 98.1 °F (36.7 °C) 98.1 °F (36.7 °C) 98.1 °F (36.7 °C) SpO2: 97% 99% 98% 98% Weight:      
Height:      
  
 
 Pain Assessment Pain Intensity 1: 0 (11/29/18 0700) Patient Stated Pain Goal: 0 Previous Outreach assessment has been reviewed. There have been no significant clinical changes since the completion of the last dated Outreach assessment. Will continue to follow up per outreach protocol. Signed By:   Cedrick Bautista RN   November 29, 2018 3:43 PM

## 2018-11-29 NOTE — PROGRESS NOTES
[facility] OUTREACH NURSE PROGRESS REPORT SUBJECTIVE: Called to assess patient secondary to transfer from CCU. MEWS Score: 1 (11/29/18 1111) Vitals:  
 11/29/18 0007 11/29/18 1197 11/29/18 0818 11/29/18 1111 BP: 136/75 146/85 145/73 143/77 Pulse: 98 76 93 90 Resp: 22 20 20 20 Temp: 98.4 °F (36.9 °C) 98.1 °F (36.7 °C) 98.1 °F (36.7 °C) 98.1 °F (36.7 °C) SpO2: 98% 97% 99% 98% Weight:      
Height:      
  
LAB DATA: 
 
Recent Labs  
  11/29/18 
0600 11/28/18 
1038 11/28/18 
0327 11/27/18 
1609  11/26/18 
2126 11/26/18 
1432  135* 135* 134*   < > 136 134* K 3.1* 3.8 3.5 3.7   < > 3.4* 4.0  
 105 104 105   < > 103 97* CO2 24 16* 15* 17*   < > 12* 12* AGAP 9 14 16 12   < > 21* 25* * 349* 281* 224*   < > 275* 433* BUN 4* 5* 6 6   < > 10 13 CREA 0.56* 0.77* 0.73* 0.76*   < > 0.98 1.06  
GFRAA >60 >60 >60 >60   < > >60 >60 GFRNA >60 >60 >60 >60   < > >60 >60  
CA 8.1* 8.3 8.2* 8.6   < > 8.8 9.3 MG  --  1.9 1.8 1.7*   < > 1.8  --   
PHOS  --   --  1.8*  --   --  2.0*  --   
ALB  --   --   --   --   --   --  4.5 TP  --   --   --   --   --   --  8.4*  
GLOB  --   --   --   --   --   --  3.9* AGRAT  --   --   --   --   --   --  1.2 ALT  --   --   --   --   --   --  54  
 < > = values in this interval not displayed. Recent Labs  
  11/26/18 
1432 WBC 7.4 HGB 16.4 HCT 48.8  OBJECTIVE: On arrival to room, I found patient to be resting in bed, watching TV. ASSESSMENT:  Pt resting in bed NAD. Respirations even and unlabored. Pt with no complaints at this time. Pt reports he is ready to go home, hopeful for discharge tomorrow. No needs reported at this time. PLAN:  Continue to follow per outreach protocol.

## 2018-11-29 NOTE — PROGRESS NOTES
Primary Nurse Moni Zaragoza and Apryl Francis RN performed a dual skin assessment on this patient No impairment noted Oral score is 23 
 
 
 11/28/18 1945 Dual Skin Pressure Injury Assessment Dual Skin Pressure Injury Assessment WDL Second Care Provider (Based on [address] [place] [address]) Apryl Francis RN

## 2018-11-29 NOTE — PROGRESS NOTES
Shift Summary: 
 
Patient rested well overnight. The patient is alert and oriented x3. The patient has active bowel sounds and is voiding well. VSS. Patient up with assist.  No needs stated at this time. Patient resting peacefully in bed. Bed in low position. All personal items within reach. Will continue to monitor and give bedside shift report to oncoming day shift nurse.

## 2018-11-29 NOTE — PROGRESS NOTES
Problem: Patient Education: Go to Patient Education Activity Goal: Patient/Family Education Nutrition Received consult for Diet Education Diabetes Diet Ed (Dr. Tessie Valdes) Assessment: 
Diet order: [facility]CHOFood/Nutrition and Pertinent History: The patient is noted to have a h/o diabetes and obesity. The patient states that he was diagnosed a few months back at [address] He reports following his discharge medications until he ran out. States that when he ran out of his medications he started using his grandmothers. The patient states that he did receive diet education at [address] but cannot remember much of anything. He states that he eats a [employer] original sandwich almost daily with 4 individual packages of potato chips. He reports that he occassionally orders 2 sandwiches from [employer]. The patient was able to name a few carbohydrate sources. RD reviewed diabetes basics with the patient and encouraged him to attend a diabetes outpatient education class. Anthropometrics:Height: 5' 7\" (170.2 cm),  Weight: 158.3 kg (348 lb 15.8 oz), Weight Source:Unknown, Body mass index is 54.66 kg/m². BMI class of morbid obesity class III. Macronutrient needs: EER:  0860-2753 kcal /day (12-15 kcal/kg Actual BW) EPR:  67-87 grams protein/day (1-1.3 grams/kg IBW) Nutrition Diagnosis: Food and nutrition knowledge deficit r/t lack of exposure to nutrition related information as evidenced by patient reports that he \"forgot\" the education he received while at [address] regarding diabetes. Intervention:  
Meals and snacks: CCHO Education: Provided patient with written and verbal instruction on CCHO/diabetic diet. Handouts provided: Ready, Set, Start counting, Diabetes Plate Planner and My Diet Planner for Diabetes Patient verbalizes fair understanding of diet. Expect fair compliance.  
Nutrition Discharge Plan: Encouraged patient to find an outpatient diabetes education class to attend. [address] [place] [address], [place]  
[phone]

## 2018-11-29 NOTE — PROGRESS NOTES
Progress Note Patient: Marla Mccauley MRN: [id number]  SSN: [id number] YOB: 2000  Age: [address] y.o. Sex: male Admit Date: 11/26/2018 LOS: 3 days Subjective:  
 
DM type 2, on insulin, obesity. Patient stated that he lost his medications piror to admission. Came with hyperglycemia and acute metabolic encephalopathy. 11/29: 
Pt seen at bedside. States that he wants to go home. I counseled him that insulin dose needs adjustment and he is still having sub optimal sugar control. He denies chest pain, cough, SOB, abdominal pain, fever, nausea/vomiting. Objective:  
 
Vitals:  
 11/28/18 1944 11/29/18 0007 11/29/18 6661 11/29/18 4984 BP: 137/82 136/75 146/85 145/73 Pulse: 98 98 76 93 Resp: 24 22 20 20 Temp: 98.5 °F (36.9 °C) 98.4 °F (36.9 °C) 98.1 °F (36.7 °C) 98.1 °F (36.7 °C) SpO2: 98% 98% 97% 99% Weight:      
Height:      
  
 
Intake and Output: 
Current Shift: No intake/output data recorded. Last three shifts: 11/27 1901 - 11/29 0700 In: 5836.7 [P.O.:3420; I.V.:2416.7] Out: 8975 [WPDED:1417] Physical Exam:  
 
General:                  morbidly obese, NAD HEENT:                  Head NCAT, PERRLA+,  
Lungs:                       Clear to auscultation bilaterally without wheezes or crackles. No respiratory distress or accessory muscle use. Heart:                                  Regular rate and rhythm, without murmurs, rubs, or gallops. Abdomen:                  Soft, non-tender, distended due to obesity with normoactive bowel sounds. Genitourinary:           No tenderness over the bladder or bilateral CVAs. Extremities:               Without clubbing, cyanosis, or edema. Skin:                                    Normal color, texture, and turgor. No rashes, lesions, or jaundice. Pulses:                      Radial and dorsalis pedis pulses present 2+ bilaterally. Capillary refill <2s. Neurologic:                CN II-XII grossly intact and symmetrical.  
                                            Moving all four extremities well with no focal deficits. Psychiatric:                Pleasant demeanor, appropriate affect. Alert and oriented x 3 Lab/Data Review: 
 
Recent Results (from the past 24 hour(s)) METABOLIC PANEL, BASIC Collection Time: 11/28/18 10:38 AM  
Result Value Ref Range Sodium 135 (L) 136 - 145 mmol/L Potassium 3.8 3.5 - 5.1 mmol/L Chloride 105 98 - 107 mmol/L  
 CO2 16 (L) 21 - 32 mmol/L Anion gap 14 7 - 16 mmol/L Glucose 349 (H) 65 - 100 mg/dL BUN 5 (L) 6 - 23 MG/DL Creatinine 0.77 (L) 0.8 - 1.5 MG/DL  
 GFR est AA >60 >60 ml/min/1.73m2 GFR est non-AA >60 >60 ml/min/1.73m2 Calcium 8.3 8.3 - 10.4 MG/DL MAGNESIUM Collection Time: 11/28/18 10:38 AM  
Result Value Ref Range Magnesium 1.9 1.8 - 2.4 mg/dL GLUCOSE, POC Collection Time: 11/28/18 11:39 AM  
Result Value Ref Range Glucose (POC) 346 (H) 65 - 100 mg/dL GLUCOSE, POC Collection Time: 11/28/18  5:06 PM  
Result Value Ref Range Glucose (POC) 288 (H) 65 - 100 mg/dL GLUCOSE, POC Collection Time: 11/28/18  8:50 PM  
Result Value Ref Range Glucose (POC) 264 (H) 65 - 100 mg/dL METABOLIC PANEL, BASIC Collection Time: 11/29/18  6:00 AM  
Result Value Ref Range Sodium 140 136 - 145 mmol/L Potassium 3.1 (L) 3.5 - 5.1 mmol/L Chloride 107 98 - 107 mmol/L  
 CO2 24 21 - 32 mmol/L Anion gap 9 7 - 16 mmol/L Glucose 221 (H) 65 - 100 mg/dL BUN 4 (L) 6 - 23 MG/DL Creatinine 0.56 (L) 0.8 - 1.5 MG/DL  
 GFR est AA >60 >60 ml/min/1.73m2 GFR est non-AA >60 >60 ml/min/1.73m2 Calcium 8.1 (L) 8.3 - 10.4 MG/DL  
GLUCOSE, POC Collection Time: 11/29/18  7:40 AM  
Result Value Ref Range Glucose (POC) 224 (H) 65 - 100 mg/dL Current Facility-Administered Medications:   insulin lispro (HUMALOG) injection 15 Units, 15 Units, SubCUTAneous, TIDAC, Brenna Ackerman MD 
  insulin lispro (HUMALOG) injection, , SubCUTAneous, AC&HS, Ezio Romero DO, 6 Units at 11/28/18 2200 
  insulin glargine (LANTUS) injection 50 Units, 50 Units, SubCUTAneous, Q12H, Marcela Capellan MD, 50 Units at 11/28/18 2147   lisinopril (PRINIVIL, ZESTRIL) tablet 5 mg, 5 mg, Oral, DAILY, Marcela Capellan MD, 5 mg at 11/28/18 1450 
  0.9% sodium chloride infusion, 100 mL/hr, IntraVENous, CONTINUOUS, Marcela Capellan MD, Last Rate: 100 mL/hr at 11/28/18 1455, 100 mL/hr at 11/28/18 1455   acetaminophen (TYLENOL) tablet 650 mg, 650 mg, Oral, Q4H PRN, Ezio Romero DO 
  HYDROcodone-acetaminophen (NORCO) 5-325 mg per tablet 1 Tab, 1 Tab, Oral, Q4H PRN, Ezio Romero DO 
  naloxone [facility]) injection 0.4 mg, 0.4 mg, IntraVENous, PRN, Ezio Romero DO 
  enoxaparin (LOVENOX) injection 40 mg, 40 mg, SubCUTAneous, Q12H, Monika Coronado DO, 40 mg at 11/29/18 9668   hydrALAZINE (APRESOLINE) 20 mg/mL injection 10 mg, 10 mg, IntraVENous, Q6H PRN, Julio Carreno MD, 10 mg at 11/26/18 2002 
  NUTRITIONAL SUPPORT ELECTROLYTE PRN ORDERS, , Does Not Apply, PRN, Fortino Lee MD 
 
 
Assessment:  
 
Principal Problem: 
  DKA (diabetic ketoacidoses) ([facility] [address] (11/26/2018) Active Problems: 
  Type II diabetes mellitus, uncontrolled ([facility] [address] (11/26/2018) Morbid obesity ([facility] [address] (11/26/2018) Acute metabolic encephalopathy (98/69/6552) Plan:  
 
DKA: resolved Blood glucose still not optimally controlled, ranging from 220's to 330's I emphasized with patient the importance of compliance with treatment. Increased lantus to 50 units AC/HS Increased humalog to 15 units TIDAC --> may need to go up Acute metabolic encephalopathy: resolved Obesity Counseled for diet and life style modification DVT prophylaxis : lovenox Plan for discharge to home tomorrow. Signed By: Russel Bee MD   
 November 29, 2018

## 2018-11-29 NOTE — PROGRESS NOTES
Problem: Falls - Risk of 
Goal: *Absence of Falls Document [facility] Levels Fall Risk and appropriate interventions in the flowsheet. Outcome: Progressing Towards Goal 
Fall Risk Interventions: 
Mobility Interventions: Assess mobility with egress test, Bed/chair exit alarm, Patient to call before getting OOB, Strengthening exercises (ROM-active/passive) Medication Interventions: Evaluate medications/consider consulting pharmacy, Patient to call before getting OOB, Teach patient to arise slowly

## 2018-11-29 NOTE — PROGRESS NOTES
Interdisciplinary Rounds completed 11/29/18. Nursing, Case Management, Physician and PT present. Plan of care reviewed and updated Will be ready for d/c in am

## 2018-11-29 NOTE — DIABETES MGMT
. Blood glucose range yesterday [phone] with pt receiving a total 149 units of insulin (Lantus 100 units, Humalog 40 units and regular 9 units). Reviewed current insulin regimen: Lantus 50 units every 12 hours, Humalog 15 units ac and Humalog sliding scale coverage 4x/day ac and hs, including type of insulin, timing with meals, onset, peak of action and duration of effect. Pt verbalizes understanding. Discussed target goals for HbA1c and blood glucose and recommended monitoring qid, fasting, before meals with 2 hr postprandial checks after largest meals, and hs and to record in log book to bring to PCP appointments to assist with medications titration. Discussed the importance of blood glucose control to help prevent complications of diabetes. Pt has had diabetes diet education with the RD. Discussed the importance of following a consistent carbohydrate diet. Stressed the importance of follow up care for diabetes management with PCP. Pt states that he has an appointment on Monday, Dec 3rd in [place], [place]. Stressed importance of medication compliance, following a consistent carbohydrate diet, monitoring blood glucose and weight loss/exercise. Pt verbalizes understanding and has no further questions at this time.

## 2018-11-30 VITALS
HEART RATE: 87 BPM | TEMPERATURE: 98.5 F | SYSTOLIC BLOOD PRESSURE: 137 MMHG | RESPIRATION RATE: 18 BRPM | OXYGEN SATURATION: 94 % | DIASTOLIC BLOOD PRESSURE: 77 MMHG | HEIGHT: 67 IN | BODY MASS INDEX: 49.44 KG/M2 | WEIGHT: 315 LBS

## 2018-11-30 LAB — GLUCOSE BLD STRIP.AUTO-MCNC: 183 MG/DL (ref 65–100)

## 2018-11-30 PROCEDURE — 74011250637 HC RX REV CODE- 250/637: Performed by: INTERNAL MEDICINE

## 2018-11-30 PROCEDURE — 82962 GLUCOSE BLOOD TEST: CPT

## 2018-11-30 PROCEDURE — 74011250637 HC RX REV CODE- 250/637: Performed by: HOSPITALIST

## 2018-11-30 PROCEDURE — 74011250636 HC RX REV CODE- 250/636: Performed by: INTERNAL MEDICINE

## 2018-11-30 PROCEDURE — 74011250636 HC RX REV CODE- 250/636: Performed by: FAMILY MEDICINE

## 2018-11-30 PROCEDURE — 74011636637 HC RX REV CODE- 636/637: Performed by: HOSPITALIST

## 2018-11-30 PROCEDURE — 74011636637 HC RX REV CODE- 636/637: Performed by: INTERNAL MEDICINE

## 2018-11-30 PROCEDURE — 74011636637 HC RX REV CODE- 636/637: Performed by: FAMILY MEDICINE

## 2018-11-30 RX ORDER — POTASSIUM CHLORIDE 20 MEQ/1
40 TABLET, EXTENDED RELEASE ORAL 2 TIMES DAILY
Qty: 8 TAB | Refills: 0 | Status: SHIPPED | OUTPATIENT
Start: 2018-11-30 | End: 2018-12-02

## 2018-11-30 RX ORDER — INSULIN LISPRO 100 [IU]/ML
15 INJECTION, SOLUTION INTRAVENOUS; SUBCUTANEOUS
Qty: 5 PEN | Refills: 2 | Status: SHIPPED | OUTPATIENT
Start: 2018-11-30

## 2018-11-30 RX ORDER — INSULIN GLARGINE 100 [IU]/ML
50 INJECTION, SOLUTION SUBCUTANEOUS
Qty: 5 PEN | Refills: 2 | Status: SHIPPED | OUTPATIENT
Start: 2018-11-30 | End: 2018-12-30

## 2018-11-30 RX ORDER — LISINOPRIL 5 MG/1
5 TABLET ORAL DAILY
Qty: 30 TAB | Refills: 1 | Status: SHIPPED | OUTPATIENT
Start: 2018-11-30 | End: 2018-12-30

## 2018-11-30 RX ADMIN — SODIUM CHLORIDE 100 ML/HR: 900 INJECTION, SOLUTION INTRAVENOUS at 08:54

## 2018-11-30 RX ADMIN — INSULIN LISPRO 2 UNITS: 100 INJECTION, SOLUTION INTRAVENOUS; SUBCUTANEOUS at 08:56

## 2018-11-30 RX ADMIN — ENOXAPARIN SODIUM 40 MG: 40 INJECTION, SOLUTION INTRAVENOUS; SUBCUTANEOUS at 05:25

## 2018-11-30 RX ADMIN — INSULIN LISPRO 15 UNITS: 100 INJECTION, SOLUTION INTRAVENOUS; SUBCUTANEOUS at 08:56

## 2018-11-30 RX ADMIN — LISINOPRIL 5 MG: 5 TABLET ORAL at 08:55

## 2018-11-30 RX ADMIN — POTASSIUM CHLORIDE 40 MEQ: 20 TABLET, EXTENDED RELEASE ORAL at 08:55

## 2018-11-30 RX ADMIN — INSULIN GLARGINE 50 UNITS: 100 INJECTION, SOLUTION SUBCUTANEOUS at 08:55

## 2018-11-30 NOTE — PROGRESS NOTES
Date of Outreach Update: 
Juanis Poe was seen and assessed. Pt AOx4, no acute concerns at this time. Last BS check 243. MEWS Score: 1 (11/29/18 1941) Vitals:  
 11/29/18 0818 11/29/18 1111 11/29/18 1524 11/29/18 1941 BP: 145/73 143/77 146/84 141/79 Pulse: 93 90 88 88 Resp: 20 20 20 18 Temp: 98.1 °F (36.7 °C) 98.1 °F (36.7 °C) 98.1 °F (36.7 °C) 97.9 °F (36.6 °C) SpO2: 99% 98% 98% 100% Weight:      
Height:      
  
 
 Pain Assessment Pain Intensity 1: 0 (11/29/18 1920) Patient Stated Pain Goal: 0 Previous Outreach assessment has been reviewed. There have been no significant clinical changes since the completion of the last dated Outreach assessment. Will continue to follow up per outreach protocol. Signed By:   Aidan Victoria RN   November 29, 2018 9:54 PM

## 2018-11-30 NOTE — PROGRESS NOTES
Hourly rounds performed this shift. Bed lowered and locked, side rails x2, and call light in reach. All patient needs are met at this time. No complaints from patient this shift. FSBS have been elevated today. 220-283 range. Administered insulin per orders. Patient ate 100% of each meals. Will continue to monitor and give bedside shift report to oncoming nurse.

## 2018-11-30 NOTE — PROGRESS NOTES
Shift Summary: 
 
Patient rested well overnight. The patient is alert and oriented x3. The patient has active bowel sounds and is voiding well. VSS. Patient up with ad gopi. No needs stated at this time. Patient resting peacefully in bed. Bed in low position. All personal items within reach. Will continue to monitor and give bedside shift report to oncoming day shift nurse.

## 2018-11-30 NOTE — PROGRESS NOTES
Problem: Falls - Risk of 
Goal: *Absence of Falls Document [facility] Heart Fall Risk and appropriate interventions in the flowsheet. Outcome: Progressing Towards Goal 
Fall Risk Interventions: 
Mobility Interventions: OT consult for ADLs, Patient to call before getting OOB, PT Consult for mobility concerns, PT Consult for assist device competence Medication Interventions: Evaluate medications/consider consulting pharmacy, Patient to call before getting OOB, Teach patient to arise slowly

## 2018-11-30 NOTE — DIABETES MGMT
Blood glucose 183. Reviewed current insulin regimen: Lantus 50 units every 12 hours, Humalog 15 units 3x/day ac and Humalog sliding scale coverage 4x/day ac and hs, including type of insulin, timing with meals. , onset, peak of action and duration of effect. Pt verbalizes understanding. Explained the importance of blood glucose monitoring. Recommended checking qid, fasting, ac, 2 hour post prandial, and hs and to record in log book to bring to PCP appointments to assist with medication titration. Discussed target goals for blood glucose and HbA1c. Stressed the importance of follow up care for diabetes management with PCP. Pt states that he has a PCP appointment today in [place], [place]. Discussed the importance of medication compliance, following a consistent carbohydrate diet, monitoring blood glucose, and exercise. Encouraged pt to attend diabetes education classes. Pt verbalizes understanding and has no further questions at this time.

## 2018-11-30 NOTE — PROGRESS NOTES
Discharge instructions and prescriptions given and reviewed with pt, verbalizes understanding, pt discharged home.

## 2018-11-30 NOTE — DISCHARGE INSTRUCTIONS
DISCHARGE SUMMARY from Nurse    PATIENT INSTRUCTIONS:    After general anesthesia or intravenous sedation, for 24 hours or while taking prescription Narcotics:  · Limit your activities  · Do not drive and operate hazardous machinery  · Do not make important personal or business decisions  · Do  not drink alcoholic beverages  · If you have not urinated within 8 hours after discharge, please contact your surgeon on call. Report the following to your surgeon:  · Excessive pain, swelling, redness or odor of or around the surgical area  · Temperature over 100.5  · Nausea and vomiting lasting longer than 4 hours or if unable to take medications  · Any signs of decreased circulation or nerve impairment to extremity: change in color, persistent  numbness, tingling, coldness or increase pain  · Any questions    What to do at Home:  Recommended activity: Activity as tolerated, diabetic diet    If you experience any of the following symptoms . uncontrolled blood sugars, changes in mental status, fever greater than 101, or nausea/vomiting, please follow up with your primary care physician. *  Please give a list of your current medications to your Primary Care Provider. *  Please update this list whenever your medications are discontinued, doses are      changed, or new medications (including over-the-counter products) are added. *  Please carry medication information at all times in case of emergency situations. These are general instructions for a healthy lifestyle:    No smoking/ No tobacco products/ Avoid exposure to second hand smoke  Surgeon General's Warning:  Quitting smoking now greatly reduces serious risk to your health.     Obesity, smoking, and sedentary lifestyle greatly increases your risk for illness    A healthy diet, regular physical exercise & weight monitoring are important for maintaining a healthy lifestyle    You may be retaining fluid if you have a history of heart failure or if you experience any of the following symptoms:  Weight gain of 3 pounds or more overnight or 5 pounds in a week, increased swelling in our hands or feet or shortness of breath while lying flat in bed. Please call your doctor as soon as you notice any of these symptoms; do not wait until your next office visit. Recognize signs and symptoms of STROKE:    F-face looks uneven    A-arms unable to move or move unevenly    S-speech slurred or non-existent    T-time-call 911 as soon as signs and symptoms begin-DO NOT go       Back to bed or wait to see if you get better-TIME IS BRAIN. Warning Signs of HEART ATTACK     Call 911 if you have these symptoms:   Chest discomfort. Most heart attacks involve discomfort in the center of the chest that lasts more than a few minutes, or that goes away and comes back. It can feel like uncomfortable pressure, squeezing, fullness, or pain.  Discomfort in other areas of the upper body. Symptoms can include pain or discomfort in one or both arms, the back, neck, jaw, or stomach.  Shortness of breath with or without chest discomfort.  Other signs may include breaking out in a cold sweat, nausea, or lightheadedness. Don't wait more than five minutes to call 911 - MINUTES MATTER! Fast action can save your life. Calling 911 is almost always the fastest way to get lifesaving treatment. Emergency Medical Services staff can begin treatment when they arrive -- up to an hour sooner than if someone gets to the hospital by car. The discharge information has been reviewed with the patient. The patient verbalized understanding. Discharge medications reviewed with the patient and appropriate educational materials and side effects teaching were provided.   ___________________________________________________________________________________________________________________________________

## 2018-11-30 NOTE — DISCHARGE SUMMARY
Hospitalist Discharge Summary     Patient ID:  Marlen Azar  [id number]  4 y.o.  2000  Admit date: 11/26/2018  2:56 PM  Discharge date and time: 11/30/2018  Attending: Tee Mccrary DO  PCP:  Unknown, Provider  Treatment Team: Attending Provider: Tee Mccrary DO; Care Manager: Dawn Hooper RN; Utilization Review: Eloy Duenas RN; Utilization Review: Ant Silva RN    Principal Diagnosis:    DKA due to medication non compliance  Morbid obesity  Acute encephalopathy          Principal Problem:    DKA (diabetic ketoacidoses) ([facility] (11/26/2018)    Active Problems:    Type II diabetes mellitus, uncontrolled ([facility] (11/26/2018)      Morbid obesity ([facility] (11/26/2018)      Acute metabolic encephalopathy (96/31/3697)             Hospital Course:  Please refer to the admission H&P for details of presentation. In summary, the patient is a 19years old morbidly obese male with pmhx of recently diagnosed IDDM-1, admitted on 11/26 with DKA and acute metabolic encephalopathy. Pt was admitted in ICU with DKA protocol and aggressive IV hydration. Pt's HbA1c was 12.1. Pt reported not being compliant with insulin regiment at home. Diabetic education was given, pt was counseled for lifestyle and dietary modifications. Anion gap was close, following which pt was bridged and transitioned to subcutaneous insulin. Insulin dose was adjusted, pt was continued on lantus 50 units AC/HS along with humalog 15 units TIDAC. POC glucose was optimally controlled with range from 183 to 220. Pt is eagerly waiting to be discharged and has an appointment set up for 11/20/18 in [place], [place]. Pt is counseled for medication noncompliance. Rest of the hospital course was uneventful. Significant Diagnostic Studies:   No diagnostic imaging done during this hospitalization.     Labs: Results:       Chemistry Recent Labs     11/29/18  0600 11/28/18  1038 11/28/18  0327   * 349* 281*    135* 135*   K 3. 1* 3.8 3.5    105 104   CO2 24 16* 15*   BUN 4* 5* 6   CREA 0.56* 0.77* 0.73*   CA 8.1* 8.3 8.2*   AGAP 9 14 16      CBC w/Diff No results for input(s): WBC, RBC, HGB, HCT, PLT, GRANS, LYMPH, EOS, HGBEXT, HCTEXT, PLTEXT in the last 72 hours. Cardiac Enzymes No results for input(s): CPK, CKND1, SOPHIE in the last 72 hours. No lab exists for component: CKRMB, TROIP   Coagulation No results for input(s): PTP, INR, APTT in the last 72 hours. No lab exists for component: INREXT    Lipid Panel No results found for: CHOL, CHOLPOCT, CHOLX, CHLST, CHOLV, 847655, HDL, LDL, LDLC, DLDLP, 588557, VLDLC, VLDL, TGLX, TRIGL, TRIGP, TGLPOCT, CHHD, CHHDX   BNP No results for input(s): BNPP in the last 72 hours. Liver Enzymes No results for input(s): TP, ALB, TBIL, AP, SGOT, GPT in the last 72 hours. No lab exists for component: DBIL   Thyroid Studies No results found for: T4, T3U, TSH, TSHEXT         Discharge Exam:  Visit Vitals  /77   Pulse 87   Temp 98.5 °F (36.9 °C)   Resp 18   Ht 5' 7\" (1.702 m)   Wt 158.3 kg (348 lb 15.8 oz)   SpO2 94%   BMI 54.66 kg/m²     General:                  morbidly obese, NAD  HEENT:                  Head NCAT, PERRLA+,   Lungs:                       Clear to auscultation bilaterally without wheezes or crackles.                                             No respiratory distress or accessory muscle use. Heart:                                  Regular rate and rhythm, without murmurs, rubs, or gallops. Abdomen:                  Soft, non-tender, distended due to obesity with normoactive bowel sounds. Genitourinary:           No tenderness over the bladder or bilateral CVAs. Extremities:               Without clubbing, cyanosis, or edema. Skin:                                    Normal color, texture, and turgor. No rashes, lesions, or jaundice. Pulses:                      Radial and dorsalis pedis pulses present 2+ bilaterally.                                             Capillary refill <2s. Neurologic:                CN II-XII grossly intact and symmetrical.                                               Moving all four extremities well with no focal deficits. Psychiatric:                Pleasant demeanor, appropriate affect. Alert and oriented x 3        Disposition: home  Discharge Condition: stable  Patient Instructions:   Current Discharge Medication List      START taking these medications    Details   insulin glargine (LANTUS,BASAGLAR) 100 unit/mL (3 mL) inpn 50 Units by SubCUTAneous route ACB/HS for 30 days. Qty: 5 Pen, Refills: 2      insulin lispro (HUMALOG) 100 unit/mL kwikpen 15 Units by SubCUTAneous route Before breakfast, lunch, and dinner. Qty: 5 Pen, Refills: 2      potassium chloride (K-DUR, KLOR-CON) 20 mEq tablet Take 2 Tabs by mouth two (2) times a day for 2 days. Qty: 8 Tab, Refills: 0      lisinopril (PRINIVIL, ZESTRIL) 5 mg tablet Take 1 Tab by mouth daily for 30 days. Qty: 30 Tab, Refills: 1             Activity: Activity as tolerated  Diet: Cardiac Diet and Diabetic Diet  Wound Care: None needed    Follow-up  ·   Follow up with Endocrinologist on 11/30 in [place], [place].   Time spent to discharge patient 35 minutes  Signed:  Sondra Santoyo MD  11/30/2018  8:22 AM